# Patient Record
Sex: FEMALE | Race: WHITE | NOT HISPANIC OR LATINO | Employment: OTHER | URBAN - METROPOLITAN AREA
[De-identification: names, ages, dates, MRNs, and addresses within clinical notes are randomized per-mention and may not be internally consistent; named-entity substitution may affect disease eponyms.]

---

## 2017-04-05 ENCOUNTER — HOSPITAL ENCOUNTER (OUTPATIENT)
Facility: CLINIC | Age: 77
Discharge: HOME | End: 2017-04-05
Attending: OPHTHALMOLOGY | Admitting: OPHTHALMOLOGY
Payer: COMMERCIAL

## 2017-04-05 ENCOUNTER — SURGERY (OUTPATIENT)
Age: 77
End: 2017-04-05

## 2017-04-05 VITALS
DIASTOLIC BLOOD PRESSURE: 61 MMHG | TEMPERATURE: 96.8 F | OXYGEN SATURATION: 97 % | SYSTOLIC BLOOD PRESSURE: 131 MMHG | RESPIRATION RATE: 16 BRPM

## 2017-04-05 PROCEDURE — 25000132 ZZH RX MED GY IP 250 OP 250 PS 637: Performed by: OPHTHALMOLOGY

## 2017-04-05 PROCEDURE — 66821 AFTER CATARACT LASER SURGERY: CPT | Performed by: OPHTHALMOLOGY

## 2017-04-05 RX ORDER — TROPICAMIDE 10 MG/ML
1 SOLUTION/ DROPS OPHTHALMIC ONCE
Status: COMPLETED | OUTPATIENT
Start: 2017-04-05 | End: 2017-04-05

## 2017-04-05 RX ORDER — PHENYLEPHRINE HYDROCHLORIDE 25 MG/ML
1 SOLUTION/ DROPS OPHTHALMIC ONCE
Status: COMPLETED | OUTPATIENT
Start: 2017-04-05 | End: 2017-04-05

## 2017-04-05 RX ADMIN — APRACLONIDINE HYDROCHLORIDE 1 DROP: 10 SOLUTION/ DROPS OPHTHALMIC at 10:14

## 2017-04-05 RX ADMIN — TROPICAMIDE 1 DROP: 10 SOLUTION/ DROPS OPHTHALMIC at 10:13

## 2017-04-05 RX ADMIN — PHENYLEPHRINE HYDROCHLORIDE 1 DROP: 2.5 SOLUTION/ DROPS OPHTHALMIC at 10:13

## 2017-04-05 NOTE — OP NOTE
[facility]  Ophthalmology Operative Note    Procedure: Yag laser capsulotomy  Diagnosis: secondary membrane (after cataract)  Eye: OD (right eye)    Surgeon: Gary Blackburn MD  Settings: 2.3 mJ energy/burst                 14  bursts  Total energy: 32.2    1 drop iopidine instilled after procedure.      Comments: Pt. to follow up at office in 48 hours.

## 2017-09-08 ENCOUNTER — HOSPITAL ENCOUNTER (OUTPATIENT)
Dept: MAMMOGRAPHY | Facility: CLINIC | Age: 77
Discharge: HOME | End: 2017-09-08
Attending: INTERNAL MEDICINE | Admitting: INTERNAL MEDICINE
Payer: COMMERCIAL

## 2017-09-08 DIAGNOSIS — Z12.31 VISIT FOR SCREENING MAMMOGRAM: ICD-10-CM

## 2017-09-08 PROCEDURE — G0202 SCR MAMMO BI INCL CAD: HCPCS

## 2017-09-08 PROCEDURE — 77063 BREAST TOMOSYNTHESIS BI: CPT

## 2018-09-14 ENCOUNTER — HOSPITAL ENCOUNTER (OUTPATIENT)
Dept: MAMMOGRAPHY | Facility: CLINIC | Age: 78
Discharge: HOME | End: 2018-09-14
Attending: INTERNAL MEDICINE | Admitting: INTERNAL MEDICINE
Payer: COMMERCIAL

## 2018-09-14 DIAGNOSIS — Z12.31 VISIT FOR SCREENING MAMMOGRAM: ICD-10-CM

## 2018-09-14 PROCEDURE — 77063 BREAST TOMOSYNTHESIS BI: CPT

## 2019-09-16 ENCOUNTER — HOSPITAL ENCOUNTER (OUTPATIENT)
Dept: MAMMOGRAPHY | Facility: CLINIC | Age: 79
Discharge: HOME | End: 2019-09-16
Attending: INTERNAL MEDICINE | Admitting: INTERNAL MEDICINE
Payer: COMMERCIAL

## 2019-09-16 DIAGNOSIS — Z12.31 VISIT FOR SCREENING MAMMOGRAM: ICD-10-CM

## 2019-09-16 PROCEDURE — 77063 BREAST TOMOSYNTHESIS BI: CPT

## 2020-08-28 ENCOUNTER — APPOINTMENT (OUTPATIENT)
Dept: ULTRASOUND IMAGING | Facility: CLINIC | Age: 80
End: 2020-08-28
Attending: EMERGENCY MEDICINE
Payer: COMMERCIAL

## 2020-08-28 ENCOUNTER — HOSPITAL ENCOUNTER (EMERGENCY)
Facility: CLINIC | Age: 80
Discharge: HOME | End: 2020-08-28
Attending: EMERGENCY MEDICINE | Admitting: EMERGENCY MEDICINE
Payer: COMMERCIAL

## 2020-08-28 VITALS
RESPIRATION RATE: 16 BRPM | DIASTOLIC BLOOD PRESSURE: 72 MMHG | HEART RATE: 61 BPM | TEMPERATURE: 98.6 F | OXYGEN SATURATION: 96 % | SYSTOLIC BLOOD PRESSURE: 165 MMHG

## 2020-08-28 DIAGNOSIS — M79.622 PAIN OF LEFT UPPER ARM: ICD-10-CM

## 2020-08-28 PROCEDURE — 99284 EMERGENCY DEPT VISIT MOD MDM: CPT | Mod: 25

## 2020-08-28 PROCEDURE — 93005 ELECTROCARDIOGRAM TRACING: CPT

## 2020-08-28 PROCEDURE — 93971 EXTREMITY STUDY: CPT | Mod: LT

## 2020-08-28 PROCEDURE — 93931 UPPER EXTREMITY STUDY: CPT | Mod: LT

## 2020-08-28 ASSESSMENT — ENCOUNTER SYMPTOMS
NUMBNESS: 0
MYALGIAS: 1

## 2020-08-28 NOTE — ED PROVIDER NOTES
History     Chief Complaint:  Arm Pain     HPI:  Brittani Ty is a 80 year old female on Eliquis who presents from [place] urgent care with left arm pain and swelling. Patient reports aching left arm pain and swelling for the last couple months, worse within the last 2 weeks. She states that she has had vertigo for years and must sleep on her left side. Patient reports that the pain wakes her up at night. She denies any numbness, tingling, or chest pain.    Allergies:  Augmentin  Celecoxib  Percocet   Propranolol  Vioxx     Medications:    Eliquis  Lipitor  Rocaltrol  Diltiazem  Benadryl  Levothyroxine  Prilosec  Miralax  Metamucil  Pravachol  Elavil  Toprol    Past Medical History:    Aortic valve disorder  Arthritis  Hyperlipidemia  Hypertension  Hypoparathyroidism  Mitral valve disorder  Rheumatic heart disease  Thyroid disease  Vitamin D deficiency  A-fib     Past Surgical History:    Cholecystectomy  Hysterectomy  Capsulotomy  Phacoemulsification, clear cornea with standard intraocular lens implant  Sphincterotomy rectum   Parathyroidectomy  Total knee arthroplasty  Thyroidectomy  Oophorectomy     Family History:    Prostate cancer  Heart disease  Colon cancer  Leukemia  MI    Social History:  Smoking status: never  Alcohol use: rare  Drug use: no  Patient presents with son.  PCP: Norah Whittaker    Marital Status:       Review of Systems   Cardiovascular: Negative for chest pain.   Musculoskeletal: Positive for myalgias.        Edema, left arm   Neurological: Negative for numbness.   All other systems reviewed and are negative.    Physical Exam     Vitals:  Patient Vitals for the past 24 hrs:   BP Temp Temp src Pulse Resp SpO2   08/28/20 1500 (!) 165/72 -- -- 61 -- 96 %   08/28/20 1432 (!) 151/79 98.6  F (37  C) Oral 67 16 98 %       Physical Exam:  General: Patient is alert and interactive when I enter the room  Head:  The scalp, face, and head appear normal  Eyes:  Conjunctivae are  normal  ENT:    The nose is normal    Pinnae are normal    External acoustic canals are normal  Neck:  Trachea midline  CV:  Pulses are normal   Resp:  No respiratory distress   Abdomen:      Soft, non-tender, non-distended  Musc:  Normal muscular tone    No major joint effusions    No asymmetric leg swelling    Tenderness to left upper arm, very mild fullness to left upper arm, no erythema or masses palpated, very good ROM of LUE including at shoulder and elbow, 2+ radial pulse, extremity warm and well perfused, able to internal rotation left shoulder with mild pain as well as extend  Skin:  No rash or lesions noted  Neuro:  Speech is normal and fluent. Face is symmetric.     Moving all extremities well.   Psych: Awake. Alert.  Normal affect.  Appropriate interactions.    Emergency Department Course     ECG (14:52:44):  Rate 63 bpm. DC interval 184. QRS duration 88. QT/QTc 454/464. P-R-T axes 35 22 30. Normal sinus rhythm. Interpreted at 1455 by Loretta Gilbert MD.    Imaging:  US Upper Extremity Venous Duplex Left    (Results Pending)   US Upper Extremity Arterial Duplex Left    (Results Pending)       Emergency Department Course:  1438  Past medical records, nursing notes, and vitals reviewed.    1448  I performed an exam of the patient and obtained history, as documented above.    1452 EKG was taken in the ED.     1512 The patient was sent for a left upper extremity ultrasounds while in the emergency department, results above.     The patient was signed out to Dr. Bradley, oncoming ED physician, pending ultrasound results.    I personally reviewed the imaging results with the Patient and spouse who voiced understanding of the findings and answered all related questions prior to sign out.     Impression & Plan     Medical Decision Making:  Brittani yT is a 80 year old female who presents to the emergency department today for evaluation of left arm pain and swelling.  Patient symptoms have been going on for  months.  She is neurovascularly intact.  Her left arm is possibly mildly more full than the right however no obvious edema or masses appreciated on exam.  She does have tenderness to palpation and does admit that she lays on her left side more frequently.  She has significant vertigo that limits her ability to lay flat and has to lay on her left side.  I think this can likely be followed up as an outpatient as I doubt any acute abnormality such as ACS, thoracic outlet syndrome or dissection given timeframe.  We will do ultrasounds of the arterial system and venous system.  Ultrasounds are pending and Dr. Morgan will follow-up on the results.  If negative can discharge with [facility] follow-up given this is likely musculoskeletal.  Patient discharged.    Diagnosis:    ICD-10-CM    1. Pain of left upper arm  M79.622         Disposition:  Signed out to Dr. Bradley, pending ultrasound results.     Discharge Medications:  New Prescriptions    No medications on file       Scribe Disclosure:  I, Radha Johnson, am serving as a scribe at 2:48 PM on 8/28/2020 to document services personally performed by Loretta Gilbert MD based on my observations and the provider's statements to me.       Radha Johnson  8/28/2020     Loretta Gilbert MD  08/28/20 1944

## 2020-08-28 NOTE — ED AVS SNAPSHOT
[facility] Emergency Department  [address]  [facility] [address]  Phone:  [phone]  Fax:  [phone]                                    Brittani Ty   MRN: [id number]    Department:  [facility] Emergency Department   Date of Visit:  8/28/2020           After Visit Summary Signature Page    I have received my discharge instructions, and my questions have been answered. I have discussed any challenges I see with this plan with the nurse or doctor.    ..........................................................................................................................................  Patient/Patient Representative Signature      ..........................................................................................................................................  Patient Representative Print Name and Relationship to Patient    ..................................................               ................................................  Date                                   Time    ..........................................................................................................................................  Reviewed by Signature/Title    ...................................................              ..............................................  Date                                               Time          22EPIC Rev 08/18

## 2020-08-28 NOTE — ED TRIAGE NOTES
Pt c/o left arm pain and swelling for several months.  Worse when she sleeps on it, no numbness/tingling. Pt sent form urgent care for further eval.

## 2020-08-28 NOTE — ED PROVIDER NOTES
Sign-out Note    Received this patient in sign-out from Dr. Gilbert at 4:04 PM.  Please refer to earlier documentation detailing presenting complaints, evaluation, and ED course.  In brief, patient is being seen in the ER for arm pain.    Recommendations from previous provider: F/U on results of LUE US    ED course under my care:    US Upper Extremity Arterial Duplex Left   Final Result   IMPRESSION: No sonographic evidence for significant steno-occlusive   disease in the sampled arteries of the left upper extremity.      KANNAN LLANOS MD      US Upper Extremity Venous Duplex Left   Final Result   IMPRESSION: No evidence of DVT. Normal left upper extremity venous   ultrasound.      RUDOLPH LOPEZ MD        Confirmed with ultrasound that the above read images correspond with the left upper extremity    Disposition: Discharge home with [facility] orthopedics follow-up as recommended by Dr. Gilbert.           Braden Bradley MD  08/28/20 7389

## 2020-08-31 LAB — INTERPRETATION ECG - MUSE: NORMAL

## 2020-10-08 ENCOUNTER — HOSPITAL ENCOUNTER (OUTPATIENT)
Dept: MAMMOGRAPHY | Facility: CLINIC | Age: 80
Discharge: HOME | End: 2020-10-08
Attending: INTERNAL MEDICINE | Admitting: INTERNAL MEDICINE
Payer: COMMERCIAL

## 2020-10-08 DIAGNOSIS — Z12.31 VISIT FOR SCREENING MAMMOGRAM: ICD-10-CM

## 2020-10-08 PROCEDURE — 77063 BREAST TOMOSYNTHESIS BI: CPT

## 2020-10-13 ENCOUNTER — OFFICE VISIT (OUTPATIENT)
Dept: OBGYN | Facility: CLINIC | Age: 80
End: 2020-10-13
Payer: COMMERCIAL

## 2020-10-13 VITALS
SYSTOLIC BLOOD PRESSURE: 162 MMHG | DIASTOLIC BLOOD PRESSURE: 62 MMHG | WEIGHT: 191 LBS | HEIGHT: 62 IN | BODY MASS INDEX: 35.15 KG/M2

## 2020-10-13 DIAGNOSIS — N95.0 POSTMENOPAUSAL BLEEDING: Primary | ICD-10-CM

## 2020-10-13 PROCEDURE — 99202 OFFICE O/P NEW SF 15 MIN: CPT | Performed by: OBSTETRICS & GYNECOLOGY

## 2020-10-13 RX ORDER — METOPROLOL SUCCINATE 50 MG/1
50 TABLET, EXTENDED RELEASE ORAL
COMMUNITY
Start: 2020-03-19 | End: 2024-03-01

## 2020-10-13 ASSESSMENT — ANXIETY QUESTIONNAIRES
1. FEELING NERVOUS, ANXIOUS, OR ON EDGE: NOT AT ALL
GAD7 TOTAL SCORE: 1
6. BECOMING EASILY ANNOYED OR IRRITABLE: NOT AT ALL
2. NOT BEING ABLE TO STOP OR CONTROL WORRYING: NOT AT ALL
IF YOU CHECKED OFF ANY PROBLEMS ON THIS QUESTIONNAIRE, HOW DIFFICULT HAVE THESE PROBLEMS MADE IT FOR YOU TO DO YOUR WORK, TAKE CARE OF THINGS AT HOME, OR GET ALONG WITH OTHER PEOPLE: NOT DIFFICULT AT ALL
5. BEING SO RESTLESS THAT IT IS HARD TO SIT STILL: NOT AT ALL
7. FEELING AFRAID AS IF SOMETHING AWFUL MIGHT HAPPEN: NOT AT ALL
3. WORRYING TOO MUCH ABOUT DIFFERENT THINGS: NOT AT ALL

## 2020-10-13 ASSESSMENT — MIFFLIN-ST. JEOR: SCORE: 1279.62

## 2020-10-13 ASSESSMENT — PATIENT HEALTH QUESTIONNAIRE - PHQ9
5. POOR APPETITE OR OVEREATING: SEVERAL DAYS
SUM OF ALL RESPONSES TO PHQ QUESTIONS 1-9: 3

## 2020-10-13 NOTE — PROGRESS NOTES
SUBJECTIVE:                                                   Brittani Ty is a 80 year old female who presents to clinic today for the following health issue(s):  Patient presents with:  Abnormal Uterine Bleeding: Pt has been experiencing PMB for a few months now. Mainly spotting and happens every few days.      HPI: Patient seen at this time for some vaginal bleeding.  She had a vaginal hysterectomy many years ago.  She then had a second surgery later to have her ovaries removed.  She has noticed some pink and some brown staining on her underwear recently.  She denies any trauma.  She has A. fib and is on a blood thinner.      No LMP recorded. Patient has had a hysterectomy..     Patient is not sexually active, No obstetric history on file..  Using menopause and hysterectomy for contraception.    reports that she has never smoked. She has never used smokeless tobacco.    STD testing offered?  Declined    Health maintenance updated:  yes    Today's PHQ-2 Score: No flowsheet data found.  Today's PHQ-9 Score:   PHQ-9 SCORE 10/13/2020   PHQ-9 Total Score 3     Today's CINDY-7 Score:   CINDY-7 SCORE 10/13/2020   Total Score 1       Problem list and histories reviewed & adjusted, as indicated.  Additional history: as documented.    Patient Active Problem List   Diagnosis     ACP (advance care planning)     A-fib (H)     Past Surgical History:   Procedure Laterality Date     CHOLECYSTECTOMY       EXAM UNDER ANESTHESIA RECTUM  4/26/2012    Procedure:EXAM UNDER ANESTHESIA RECTUM; exam under anesthesia, partial sphincterotomy; Surgeon:AMBROCIO ZUNIGA; Location: [facility]     HYSTERECTOMY       LASER YAG CAPSULOTOMY Left 4/15/2015    Procedure: LASER YAG CAPSULOTOMY;  Surgeon: Gary Blackburn MD;  Location:  [facility]     LASER YAG CAPSULOTOMY Right 4/5/2017    Procedure: LASER YAG CAPSULOTOMY;  Surgeon: Gary Blackburn MD;  [facility]:  [facility]     PHACOEMULSIFICATION CLEAR CORNEA WITH STANDARD INTRAOCULAR LENS IMPLANT Right  9/3/2014    Procedure: PHACOEMULSIFICATION CLEAR CORNEA WITH STANDARD INTRAOCULAR LENS IMPLANT;  Surgeon: Gary Blackburn MD;  Location:  [facility]     PHACOEMULSIFICATION CLEAR CORNEA WITH STANDARD INTRAOCULAR LENS IMPLANT Left 9/17/2014    Procedure: PHACOEMULSIFICATION CLEAR CORNEA WITH STANDARD INTRAOCULAR LENS IMPLANT;  Surgeon: Gary Blackburn MD;  [facility]:  [facility]     SPHINCTEROTOMY RECTUM  4/26/2012    Procedure:SPHINCTEROTOMY RECTUM; Surgeon:AMBROCIO ZUNIGA; Location[facility]      Social History     Tobacco Use     Smoking status: Never Smoker     Smokeless tobacco: Never Used   Substance Use Topics     Alcohol use: Yes     Comment: rare           Current Outpatient Medications   Medication Sig     apixaban ANTICOAGULANT (ELIQUIS) 5 MG tablet Take 1 tablet (5 mg) by mouth 2 times daily     diltiazem 180 MG 24 hr CD capsule Take 1 capsule (180 mg) by mouth daily     metoprolol succinate ER (TOPROL-XL) 50 MG 24 hr tablet Take 50 mg by mouth     Omega-3 Fatty Acids (OMEGA-3 FISH OIL PO) Take 1 capsule by mouth 2 times daily (with meals)      polyethylene glycol (MIRALAX) powder Take 17 g by mouth daily.     psyllium (METAMUCIL) 58.6 % POWD Take 1 teaspoonful by mouth daily     atorvastatin (LIPITOR) 10 MG tablet Take 10 mg by mouth daily     calcitRIOL (ROCALTROL) 0.5 MCG capsule Take 0.5 mcg by mouth daily     diphenhydrAMINE (BENADRYL) 25 MG tablet Take 25 mg by mouth nightly as needed for itching or allergies     levothyroxine (SYNTHROID, LEVOTHROID) 44 MCG tablet Take 44 mcg by mouth every other day     levothyroxine (SYNTHROID, LEVOTHROID) 88 MCG tablet Take 88 mcg by mouth every other day     No current facility-administered medications for this visit.      Allergies   Allergen Reactions     Augmentin [Amoxicillin-Pot Clavulanate]      Celecoxib      Percocet [Oxycodone-Acetaminophen]      Propranolol      Bradycardia into 40's      Vioxx        ROS:  12 point review of systems negative other than  "symptoms noted below or in the HPI.  Genitourinary: Spotting  No urinary frequency or dysuria, bladder or kidney problems      OBJECTIVE:     BP (!) 162/62   Ht 1.575 m (5' 2\")   Wt 86.6 kg (191 lb)   Breastfeeding No   BMI 34.93 kg/m    Body mass index is 34.93 kg/m .    Exam:  Constitutional:  Appearance: Well nourished, well developed alert, in no acute distress  Lymphatic: Lymph Nodes:  No other lymphadenopathy present  Skin: General Inspection:  No rashes present, no lesions present, no areas of discoloration.  Neurologic:  Mental Status:  Oriented X3.  Normal strength and tone, sensory exam grossly normal, mentation intact and speech normal.    Psychiatric:  Mentation appears normal and affect normal/bright.  Pelvic Exam:  External Genitalia:     Normal appearance for age, no discharge present, no tenderness present, no inflammatory lesions present, color normal  Vagina:     Normal vaginal vault without central or paravaginal defects, no discharge present, no inflammatory lesions present, no masses present  Bladder:     Nontender to palpation  Urethra:   Urethral Body:  Urethra palpation normal, urethra structural support normal   Urethral Meatus:  No erythema or lesions present  Cervix:     Surgically absent  Uterus:     Surgically absent  Adnexa:     Surgically absent  Perineum:     Perineum within normal limits, no evidence of trauma, no rashes or skin lesions present  Anus:     Anus within normal limits, no hemorrhoids present  Inguinal Lymph Nodes:     No lymphadenopathy present     In-Clinic Test Results:      ASSESSMENT/PLAN:                                                      80-year-old with some spotting.  Her only finding is a small area of cracking of the posterior fourchette.  Being on a blood thinner she could have just some atrophy that is leading to occasional spotting.  If this persist she will return.  There was nothing on her speculum and bimanual examination is she has had a " hysterectomy and removal of ovaries.  On her        Edyue Lucas MD  [facility]

## 2020-10-14 ASSESSMENT — ANXIETY QUESTIONNAIRES: GAD7 TOTAL SCORE: 1

## 2021-02-18 DIAGNOSIS — Z11.59 ENCOUNTER FOR SCREENING FOR OTHER VIRAL DISEASES: Primary | ICD-10-CM

## 2021-03-06 ENCOUNTER — HOSPITAL ENCOUNTER (OUTPATIENT)
Dept: LAB | Facility: CLINIC | Age: 81
Discharge: HOME | End: 2021-03-06
Attending: INTERNAL MEDICINE | Admitting: INTERNAL MEDICINE
Payer: COMMERCIAL

## 2021-03-06 DIAGNOSIS — Z11.59 ENCOUNTER FOR SCREENING FOR OTHER VIRAL DISEASES: ICD-10-CM

## 2021-03-06 LAB
SARS-COV-2 RNA RESP QL NAA+PROBE: NORMAL
SPECIMEN SOURCE: NORMAL

## 2021-03-06 PROCEDURE — U0005 INFEC AGEN DETEC AMPLI PROBE: HCPCS | Performed by: INTERNAL MEDICINE

## 2021-03-06 PROCEDURE — U0003 INFECTIOUS AGENT DETECTION BY NUCLEIC ACID (DNA OR RNA); SEVERE ACUTE RESPIRATORY SYNDROME CORONAVIRUS 2 (SARS-COV-2) (CORONAVIRUS DISEASE [COVID-19]), AMPLIFIED PROBE TECHNIQUE, MAKING USE OF HIGH THROUGHPUT TECHNOLOGIES AS DESCRIBED BY CMS-2020-01-R: HCPCS | Performed by: INTERNAL MEDICINE

## 2021-03-07 LAB
LABORATORY COMMENT REPORT: NORMAL
SARS-COV-2 RNA RESP QL NAA+PROBE: NEGATIVE
SPECIMEN SOURCE: NORMAL

## 2021-03-10 ENCOUNTER — HOSPITAL ENCOUNTER (OUTPATIENT)
Facility: CLINIC | Age: 81
Discharge: HOME | End: 2021-03-10
Attending: INTERNAL MEDICINE | Admitting: INTERNAL MEDICINE
Payer: COMMERCIAL

## 2021-03-10 VITALS
OXYGEN SATURATION: 95 % | WEIGHT: 180 LBS | HEART RATE: 60 BPM | BODY MASS INDEX: 33.13 KG/M2 | RESPIRATION RATE: 16 BRPM | SYSTOLIC BLOOD PRESSURE: 125 MMHG | HEIGHT: 62 IN | DIASTOLIC BLOOD PRESSURE: 76 MMHG | TEMPERATURE: 99.5 F

## 2021-03-10 LAB — COLONOSCOPY: NORMAL

## 2021-03-10 PROCEDURE — 45378 DIAGNOSTIC COLONOSCOPY: CPT | Performed by: INTERNAL MEDICINE

## 2021-03-10 PROCEDURE — G0500 MOD SEDAT ENDO SERVICE >5YRS: HCPCS | Performed by: INTERNAL MEDICINE

## 2021-03-10 PROCEDURE — 250N000011 HC RX IP 250 OP 636: Performed by: INTERNAL MEDICINE

## 2021-03-10 RX ORDER — LIDOCAINE 40 MG/G
CREAM TOPICAL
Status: DISCONTINUED | OUTPATIENT
Start: 2021-03-10 | End: 2021-03-10 | Stop reason: HOSPADM

## 2021-03-10 RX ORDER — FENTANYL CITRATE 50 UG/ML
INJECTION, SOLUTION INTRAMUSCULAR; INTRAVENOUS PRN
Status: DISCONTINUED | OUTPATIENT
Start: 2021-03-10 | End: 2021-03-10 | Stop reason: HOSPADM

## 2021-03-10 RX ORDER — ONDANSETRON 2 MG/ML
4 INJECTION INTRAMUSCULAR; INTRAVENOUS
Status: DISCONTINUED | OUTPATIENT
Start: 2021-03-10 | End: 2021-03-10 | Stop reason: HOSPADM

## 2021-03-10 ASSESSMENT — MIFFLIN-ST. JEOR: SCORE: 1221.78

## 2021-03-10 NOTE — PROGRESS NOTES
[facility]  Pre-Endoscopy History and Physical     Brittani Ty MRN# [id number]   YOB: 1940 Age: 80 year old   Today's Date: 03/10/2021    Date of Procedure: 3/10/2021  Primary care provider: Norah Whittaker  Type of Endoscopy: colonoscopy  Reason for Procedure: (+) Cologuard  Type of Anesthesia Anticipated: Moderate (conscious) sedation    HPI:    Brittani is a 80 year old female who will be undergoing the above procedure.      A history and physical has been performed. The patient's medications and allergies have been reviewed. The risks and benefits of the procedure and the sedation options and risks were discussed with the patient.  All questions were answered and informed consent was obtained.      Allergies   Allergen Reactions     Augmentin [Amoxicillin-Pot Clavulanate]      Celecoxib      Percocet [Oxycodone-Acetaminophen]      Propranolol      Bradycardia into 40's      Vioxx         Current Facility-Administered Medications   Medication     0.9% sodium chloride BOLUS     lidocaine (LMX4) kit     lidocaine 1 % 0.1-1 mL     ondansetron (ZOFRAN) injection 4 mg     sodium chloride (PF) 0.9% PF flush 3 mL     sodium chloride (PF) 0.9% PF flush 3 mL     sodium chloride (PF) 0.9% PF flush 3 mL       Patient Active Problem List   Diagnosis     ACP (advance care planning)     A-fib (H)        Past Medical History:   Diagnosis Date     Aortic valve disorder      Arthritis      Chest pain, unspecified     ECHO 12/2011-NO ISCHEMIA     Dyspepsia      Hyperlipidemia      Hypertension      Hypoparathyroidism (H)      Mitral valve disorder      Overweight(278.02)      PONV (postoperative nausea and vomiting)      Rheumatic heart disease      Thyroid disease     HYPOTHYROID     Vitamin D deficiency         Past Surgical History:   Procedure Laterality Date     CHOLECYSTECTOMY       COLONOSCOPY       EXAM UNDER ANESTHESIA RECTUM  4/26/2012    Procedure:EXAM UNDER ANESTHESIA RECTUM; exam under  "anesthesia, partial sphincterotomy; Surgeon:AMBROCIO ZUNIGA; Location:[facility]     HEAD & NECK SURGERY      Parathyroid tumor     HYSTERECTOMY       LASER YAG CAPSULOTOMY Left 4/15/2015    Procedure: LASER YAG CAPSULOTOMY;  Surgeon: Gary Blackburn MD;  [facility]     LASER YAG CAPSULOTOMY Right 4/5/2017    Procedure: LASER YAG CAPSULOTOMY;  Surgeon: Gary Blackburn MD;  [facility]     PHACOEMULSIFICATION CLEAR CORNEA WITH STANDARD INTRAOCULAR LENS IMPLANT Right 9/3/2014    Procedure: PHACOEMULSIFICATION CLEAR CORNEA WITH STANDARD INTRAOCULAR LENS IMPLANT;  Surgeon: Gary Blackburn MD;  Location:  [facility]     PHACOEMULSIFICATION CLEAR CORNEA WITH STANDARD INTRAOCULAR LENS IMPLANT Left 9/17/2014    Procedure: PHACOEMULSIFICATION CLEAR CORNEA WITH STANDARD INTRAOCULAR LENS IMPLANT;  Surgeon: Gary Blackburn MD;  [facility]     SPHINCTEROTOMY RECTUM  4/26/2012    Procedure:SPHINCTEROTOMY RECTUM; Surgeon:AMBROCIO ZUNIGA; Location:[facility]       Social History     Tobacco Use     Smoking status: Never Smoker     Smokeless tobacco: Never Used   Substance Use Topics     Alcohol use: Yes     Comment: rare       Family History   Problem Relation Age of Onset     Colon Cancer Mother      Colon Cancer Paternal Half-Sister      Colon Cancer Cousin          PHYSICAL EXAM:   BP (!) 148/80   Pulse 71   Temp 99.5  F (37.5  C) (Temporal)   Resp 20   Ht 1.562 m (5' 1.5\")   Wt 81.6 kg (180 lb)   SpO2 96%   BMI 33.46 kg/m   Estimated body mass index is 33.46 kg/m  as calculated from the following:    Height as of this encounter: 1.562 m (5' 1.5\").    Weight as of this encounter: 81.6 kg (180 lb).   RESP: lungs clear to auscultation - no rales, rhonchi or wheezes  CV: regular rates and rhythm    IMPRESSION   ASA Class 3 - Severe systemic disease, but not incapacitating  Mallampati Score: 2      Reginaldo Bear MD                "

## 2021-03-10 NOTE — DISCHARGE INSTRUCTIONS
Understanding Diverticulosis and Diverticulitis     Pouches or diverticula usually occur in the lower part of the colon called the sigmoid.      Diverticulitis occurs when the pouches become inflamed.     The colon (large intestine) is the last part of the digestive tract. It absorbs water from stool and changes it from a liquid to a solid. In certain cases, small pouches called diverticula can form in the colon wall. This condition is called diverticulosis. The pouches can become infected. If this happens, it becomes a more serious problem called diverticulitis. These problems can be painful. But they can be managed.   Managing Your Condition  Diet changes or taking medications are often tried first. These may be enough to bring relief. If the case is bad, surgery may be done. You and your doctor can discuss the plan that is best for you.  If You Have Diverticulosis  Diet changes are often enough to control symptoms. The main changes are adding fiber (roughage) and drinking more water. Fiber absorbs water as it travels through your colon. This helps your stool stay soft and move smoothly. Water helps this process. If needed, you may be told to take over-the-counter stool softeners. To help relieve pain, antispasmodic medications may be prescribed.  If You Have Diverticulitis  Treatment depends on how bad your symptoms are.  For mild symptoms: You may be put on a liquid diet for a short time. You may also be prescribed antibiotics. If these two steps relieve your symptoms, you may then be prescribed a high-fiber diet. If you still have symptoms, your doctor will discuss further treatment options with you.  For severe symptoms: You may need to be admitted to the hospital. There, you can be given IV antibiotics and fluids. Once symptoms are under control, the above treatments may be tried. If these don t control your condition, your doctor may discuss the option of having surgery with you.  [place] to Colon  Health  Help keep your colon healthy with a diet that includes plenty of high-fiber fruits, vegetables, and whole grains. Drink plenty of liquids like water and juice. Your doctor may also recommend avoiding seeds and nuts.          [id number] [employer], [address] [place] [address], [place] [address]. All rights reserved. This information is not intended as a substitute for professional medical care. Always follow your healthcare professional's instructions.    HIGH FIBER DIET  Fiber is present in all fruits, vegetables, cereals and grains. Fiber passes through the body undigested. A high fiber diet helps food move through the intestinal tract. The added bulk is helpful in preventing constipation. In people with diverticulosis it serves to clean out the pouches along the colon wall while preventing new ones from forming. A high fiber diet also reduces the risk of colon cancer, decreases blood cholesterol and prevents high blood sugar in people with diabetes.    The foods listed below are high in fiber and should be included in your diet. If you are not used to high fiber foods, start with 1 or 2 foods from this list. Every 3-4 days add a new one to your diet until you are eating 4 high fiber foods per day. This should give you 20-35 Gm of fiber/day. It is also important to drink a lot of water when you are on this diet (6-8 glasses a day). Water causes the fiber to swell and increases the benefit.    FOODS HIGH IN DIETARY FIBER:  BREADS: Made with 100% whole wheat flour; jorge, wheat or rye crackers; tortillas, bran muffins  CEREALS: Whole grain cereal with bran (Chex, Raisin Bran, Corn Bran), oatmeal, rolled oats, granola, wheat flakes, brown rice  NUTS: Any nuts  FRUITS: All fresh fruits along with edible skins, (bananas, citrus fruit, mangoes, pears, prunes, raisins, apples, pineapple, apricot, melon, jams and marmalades), fruit juices (especially prune juice)  VEGETABLES: All types, preferably raw or lightly  cooked: especially, celery, eggplant, potatoes, spinach, broccoli, brussel sprouts, winter squash, carrots, cauliflower, soybeans, lentils, fresh and dried beans of all kinds  OTHER: Popcorn, any spices      [id number] [employer], [address] [place] [address], [place] [address]. All rights reserved. This information is not intended as a substitute for professional medical care. Always follow your healthcare professional's instructions.

## 2021-05-31 ENCOUNTER — HOSPITAL ENCOUNTER (EMERGENCY)
Facility: CLINIC | Age: 81
Discharge: HOME | End: 2021-05-31
Attending: EMERGENCY MEDICINE | Admitting: EMERGENCY MEDICINE
Payer: COMMERCIAL

## 2021-05-31 VITALS
SYSTOLIC BLOOD PRESSURE: 149 MMHG | RESPIRATION RATE: 16 BRPM | OXYGEN SATURATION: 96 % | TEMPERATURE: 97.9 F | HEART RATE: 58 BPM | DIASTOLIC BLOOD PRESSURE: 69 MMHG

## 2021-05-31 DIAGNOSIS — I48.0 PAROXYSMAL ATRIAL FIBRILLATION (H): ICD-10-CM

## 2021-05-31 LAB
ANION GAP SERPL CALCULATED.3IONS-SCNC: 9 MMOL/L (ref 3–14)
BASOPHILS # BLD AUTO: 0.1 10E9/L (ref 0–0.2)
BASOPHILS NFR BLD AUTO: 0.5 %
BUN SERPL-MCNC: 9 MG/DL (ref 7–30)
CALCIUM SERPL-MCNC: 8.5 MG/DL (ref 8.5–10.1)
CHLORIDE SERPL-SCNC: 104 MMOL/L (ref 94–109)
CO2 SERPL-SCNC: 25 MMOL/L (ref 20–32)
CREAT SERPL-MCNC: 0.98 MG/DL (ref 0.52–1.04)
DIFFERENTIAL METHOD BLD: ABNORMAL
EOSINOPHIL # BLD AUTO: 0.2 10E9/L (ref 0–0.7)
EOSINOPHIL NFR BLD AUTO: 2.2 %
ERYTHROCYTE [DISTWIDTH] IN BLOOD BY AUTOMATED COUNT: 12.4 % (ref 10–15)
GFR SERPL CREATININE-BSD FRML MDRD: 53 ML/MIN/{1.73_M2}
GLUCOSE SERPL-MCNC: 156 MG/DL (ref 70–99)
HCT VFR BLD AUTO: 49.1 % (ref 35–47)
HGB BLD-MCNC: 16.8 G/DL (ref 11.7–15.7)
IMM GRANULOCYTES # BLD: 0 10E9/L (ref 0–0.4)
IMM GRANULOCYTES NFR BLD: 0.3 %
LYMPHOCYTES # BLD AUTO: 3.2 10E9/L (ref 0.8–5.3)
LYMPHOCYTES NFR BLD AUTO: 29.9 %
MCH RBC QN AUTO: 32.6 PG (ref 26.5–33)
MCHC RBC AUTO-ENTMCNC: 34.2 G/DL (ref 31.5–36.5)
MCV RBC AUTO: 95 FL (ref 78–100)
MONOCYTES # BLD AUTO: 0.6 10E9/L (ref 0–1.3)
MONOCYTES NFR BLD AUTO: 6.1 %
NEUTROPHILS # BLD AUTO: 6.4 10E9/L (ref 1.6–8.3)
NEUTROPHILS NFR BLD AUTO: 61 %
NRBC # BLD AUTO: 0 10*3/UL
NRBC BLD AUTO-RTO: 0 /100
PLATELET # BLD AUTO: 286 10E9/L (ref 150–450)
POTASSIUM SERPL-SCNC: 3.7 MMOL/L (ref 3.4–5.3)
RBC # BLD AUTO: 5.16 10E12/L (ref 3.8–5.2)
SODIUM SERPL-SCNC: 138 MMOL/L (ref 133–144)
TROPONIN I SERPL-MCNC: <0.015 UG/L (ref 0–0.04)
WBC # BLD AUTO: 10.5 10E9/L (ref 4–11)

## 2021-05-31 PROCEDURE — 258N000003 HC RX IP 258 OP 636: Performed by: EMERGENCY MEDICINE

## 2021-05-31 PROCEDURE — 85025 COMPLETE CBC W/AUTO DIFF WBC: CPT | Performed by: EMERGENCY MEDICINE

## 2021-05-31 PROCEDURE — 999N000055 HC STATISTIC END TITIAL CO2 MONITORING

## 2021-05-31 PROCEDURE — 96375 TX/PRO/DX INJ NEW DRUG ADDON: CPT | Mod: 59

## 2021-05-31 PROCEDURE — 96361 HYDRATE IV INFUSION ADD-ON: CPT | Mod: 59

## 2021-05-31 PROCEDURE — 92960 CARDIOVERSION ELECTRIC EXT: CPT

## 2021-05-31 PROCEDURE — 84484 ASSAY OF TROPONIN QUANT: CPT | Performed by: EMERGENCY MEDICINE

## 2021-05-31 PROCEDURE — 250N000009 HC RX 250: Performed by: EMERGENCY MEDICINE

## 2021-05-31 PROCEDURE — 80048 BASIC METABOLIC PNL TOTAL CA: CPT | Performed by: EMERGENCY MEDICINE

## 2021-05-31 PROCEDURE — 96374 THER/PROPH/DIAG INJ IV PUSH: CPT | Mod: 59

## 2021-05-31 PROCEDURE — 250N000011 HC RX IP 250 OP 636: Performed by: EMERGENCY MEDICINE

## 2021-05-31 PROCEDURE — 999N000157 HC STATISTIC RCP TIME EA 10 MIN

## 2021-05-31 PROCEDURE — 99285 EMERGENCY DEPT VISIT HI MDM: CPT | Mod: 25

## 2021-05-31 RX ORDER — DILTIAZEM HYDROCHLORIDE 5 MG/ML
20 INJECTION INTRAVENOUS ONCE
Status: COMPLETED | OUTPATIENT
Start: 2021-05-31 | End: 2021-05-31

## 2021-05-31 RX ORDER — ONDANSETRON 2 MG/ML
4 INJECTION INTRAMUSCULAR; INTRAVENOUS EVERY 30 MIN PRN
Status: DISCONTINUED | OUTPATIENT
Start: 2021-05-31 | End: 2021-05-31 | Stop reason: HOSPADM

## 2021-05-31 RX ORDER — PROPOFOL 10 MG/ML
50 INJECTION, EMULSION INTRAVENOUS ONCE
Status: COMPLETED | OUTPATIENT
Start: 2021-05-31 | End: 2021-05-31

## 2021-05-31 RX ADMIN — DILTIAZEM HYDROCHLORIDE 20 MG: 5 INJECTION, SOLUTION INTRAVENOUS at 08:27

## 2021-05-31 RX ADMIN — SODIUM CHLORIDE 500 ML: 9 INJECTION, SOLUTION INTRAVENOUS at 08:27

## 2021-05-31 RX ADMIN — PROPOFOL 70 MG: 10 INJECTION, EMULSION INTRAVENOUS at 09:55

## 2021-05-31 ASSESSMENT — ENCOUNTER SYMPTOMS
DYSURIA: 0
ABDOMINAL PAIN: 0
DIARRHEA: 0
CONSTIPATION: 0
NAUSEA: 1
LIGHT-HEADEDNESS: 0
FREQUENCY: 0
PALPITATIONS: 1
DIFFICULTY URINATING: 0
HEMATURIA: 0
FEVER: 0
BLOOD IN STOOL: 0

## 2021-05-31 NOTE — ED PROVIDER NOTES
History   Chief Complaint:  Jaw Pain       The history is provided by the patient.      Brittani Ty is a 80 year old female on Eliquis with history of atrial fibrillation, heart disease, and hypertension who presents with jaw pain. Patient states jaw pain started last night. This morning, she reports her watch was showing a heart rate consistently above 120. She states the watch shows atrial fibrillation and informs her to have a check up. Here in the ED, patient states palpitations have since resolved. She notes nausea but states this is not different from her baseline. Patient has had two cardioversions in the ED. Patient denies abdominal pain, fever, lightheadedness, leg swelling, or changes in urination or bowel movement. She further denies history of heart failure. Of note, patient is on Eliquis and states she has not missed a dose of it. No tobacco, alcohol, or drug use.     Review of Systems   Constitutional: Negative for fever.   HENT:        Jaw pain    Cardiovascular: Positive for palpitations (since resolved). Negative for leg swelling.   Gastrointestinal: Positive for nausea. Negative for abdominal pain, blood in stool, constipation and diarrhea.   Genitourinary: Negative for difficulty urinating, dysuria, frequency and hematuria.   Neurological: Negative for light-headedness.   All other systems reviewed and are negative.        Allergies:  Augmentin   Celecoxib  Percocet [Oxycodone-Acetaminophen]  Propranolol  Vioxx  Rofecoxib  Lisinopril   Sulindac    Medications:  Eliquis  Diltiazem   Elavil  Lipitor  Calcitriol  Benadryl  Levothyroxine  Metoprolol succinate  Miralax    Past Medical History:    Spondylosis  Syncope   Tremor  Atrial fibrillation   Mitral valve disorders  Rheumatic heart disease  Hypothyroidism   Hypertension   Hypercalcemia  Diverticulosis  Osteoarthritis  Hyperlipidemia   DJD  Aortic valve disorder    Past Surgical History:    Hysterectomy  Oophorectomy  Thyroidectomy    Cholecystectomy  Total knee arthroplasty  Sphincterotomy     Family History:    Father: prostate cancer, heart attack   Mother: leukemia, colon cancer    Social History:  Patient presents to the ED with son.  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      Physical Exam     Patient Vitals for the past 24 hrs:   BP Temp Temp src Pulse Resp SpO2   05/31/21 1115 (!) 149/69 -- -- 58 -- 96 %   05/31/21 1100 (!) 142/88 -- -- 57 -- 97 %   05/31/21 1045 (!) 142/66 -- -- 56 -- 97 %   05/31/21 1011 132/55 -- -- 59 -- 99 %   05/31/21 1008 132/55 -- -- 59 -- 97 %   05/31/21 1000 130/61 -- -- 60 -- 96 %   05/31/21 0955 139/74 -- -- 79 -- 98 %   05/31/21 0950 -- -- -- 91 -- 99 %   05/31/21 0945 (!) 164/76 -- -- 76 -- 99 %   05/31/21 0938 135/85 97.9  F (36.6  C) Oral 80 16 99 %   05/31/21 0915 (!) 143/123 -- -- 69 -- 95 %   05/31/21 0900 131/65 -- -- 67 -- 95 %   05/31/21 0845 129/68 -- -- 64 -- 96 %   05/31/21 0830 113/65 -- -- 105 -- 99 %   05/31/21 0815 116/88 -- -- 101 -- 99 %   05/31/21 0753 (!) 130/94 97.8  F (36.6  C) -- 104 18 96 %       Physical Exam  Constitutional: Well developed, nontox appearance  Head: Atraumatic.   Mouth/Throat: Oropharynx is clear and moist.   Neck:  no stridor  Eyes: no scleral icterus  Cardiovascular: Irregularly irregular rate and rhythm, 2+ bilat radial pulses  Pulmonary/Chest: nml resp effort, Clear BS bilat  Abdominal: ND, soft, NT, no rebound or guarding   Ext: Warm, well perfused, no edema  Neurological: A&O, symmetric facies, moves ext x4  Skin: Skin is warm and dry.   Psychiatric: Behavior is normal. Thought content normal.   Nursing note and vitals reviewed.    Emergency Department Course     ECG:  ECG taken at 0803, ECG read at 0805  Atrial fibrillation with rapid ventricular response. Nonspecific ST abnormality. Abnormal QRS-T angle, consider primary T wave abnormality. Abnormal ECG.    No significant change as compared to prior, dated 8/28/2020.  Rate 109 bpm. NC interval * ms. QRS  duration 86 ms. QT/QTc 308/414 ms. P-R-T axes * 28 186.     ECG  ECG taken at 0957, ECG read at 0957  Sinus rhythm with premature supraventricular complexes. Otherwise normal ECG.    No significant change as compared to prior, dated 5/31/2021.  Rate 67 bpm. IA interval 192 ms. QRS duration 86 ms. QT/QTc 442/467 ms. P-R-T axes 70 18 9.     Laboratory:   CBC: WBC: 10.5, HGB: 16.8 (H) , PLT: 286    BMP: Glucose 156 (H), GFR: 53 (L) , o/w WNL (Creatinine: 0.98)    Troponin (Collected 0807): <0.015      [facility]    -Cardioversion External    Date/Time: 5/31/2021 9:20 AM  Performed by: Lee De Dios MD  Authorized by: Lee De Dios MD     ED EVALUATION:      Assessment Time: 5/31/2021 9:20 AM      I have performed an Emergency Department Evaluation including taking a history and physical examination, this evaluation will be documented in the electronic medical record for this ED encounter.     Indication: atrial fibrillation    ASA Class: Class 2- mild systemic disease, no acute problems, no functional limitations    Mallampati: Grade 2- soft palate, base of uvula, tonsillar pillars, and portion of posterior pharyngeal wall visible    NPO Status: appropriately NPO for procedure  UNIVERSAL PROTOCOL   Site Marked: NA  Prior Images Obtained and Reviewed:  NA  Required items: Required blood products, implants, devices and special equipment available    Patient identity confirmed:  Verbally with patient and arm band  Patient was reevaluated immediately before administering moderate or deep sedation or anesthesia  Confirmation Checklist:  Patient's identity using two indicators, relevant allergies, procedure was appropriate and matched the consent or emergent situation and correct equipment/implants were available  Time out: Immediately prior to the procedure a time out was called    Universal Protocol: the Joint Commission Universal Protocol was followed    Preparation: Patient was  prepped and draped in usual sterile fashion          SEDATION    Patient Sedated: Yes    Sedation Type:  Moderate (conscious) sedation  Sedation:  Propofol  Vital signs: Vital signs monitored during sedation      PRE-PROCEDURE DETAILS:     Cardioversion basis:  Elective    Rhythm:  Atrial fibrillation    Electrode placement:  Anterior-posterior  Attempt one:     Cardioversion mode:  Synchronous    Waveform:  Biphasic    Shock (Joules):  100    Shock outcome:  Conversion to normal sinus rhythm  Post-procedure details:     Patient status:  Awake    PROCEDURE   Patient Tolerance:  Patient tolerated the procedure well with no immediate complications    Length of time physician/provider present for 1:1 monitoring during sedation: 5      Emergency Department Course:    Reviewed:  I reviewed nursing notes, vitals, past medical history and care everywhere    Assessments:  08 I obtained history and examined the patient as noted above.   09 I rechecked the patient and explained to her procedures regarding cardioversion.  955 I performed cardioversion, see above.  101 I reassessed the patient and discussed the results of the evaluation thus far.     Interventions:  827  mL IV  827 Cardizem 20 mg IV  955 Propofol 70 mg IV    Disposition:  The patient was discharged to home.       Impression & Plan     CMS Diagnoses: None    Medical Decision Makin year old female presenting w/ transient jaw discomfort now resolved, palpitations     EKG upon arrival demonstrates atrial fibrillation with RVR.  The patient is anticoagulated and reports that she has been cardioverted in the emergency department before.  Less likely acute ACS, PE given previous anticoagulation status.  DDx includes volume depletion, electrolyte abnormality, A. fib with RVR NOS.  Labs significant for neg trop.  Given no shortness of breath or chest discomfort, thoracic imaging deferred.  Attempted conservative treatment emergency department with  diltiazem and small fluid bolus resulting in rate control but not conversion to sinus rhythm.  Risks and benefits of cardioversion discussed and patient elected to proceed in ED.  Cardioversion performed as noted above w/ conversion NSR.  No recurrence of A fib in ED.  At this time I feel the pt is safe for discharge.  Recommendations given regarding follow up with PCP and return to the emergency department as needed for new or worsening symptoms.  Pt and son counseled on all results, disposition and diagnosis.  They are understanding and agreeable to plan. Patient discharged in stable condition.            Diagnosis:    ICD-10-CM    1. Paroxysmal atrial fibrillation (H)  I48.0          Scribe Disclosure:  Sherita LOMBARDI, am serving as a scribe at 8:06 AM on 5/31/2021 to document services personally performed by Lee De Dios MD based on my observations and the provider's statements to me.            Lee De Dios MD  06/01/21 0156

## 2021-05-31 NOTE — ED TRIAGE NOTES
Patient presents to the ED reporting awoke with jaw pain, which she often has when she is in a fib. Reports watch was showing a heart rate consistently above 120.

## 2021-05-31 NOTE — DISCHARGE INSTRUCTIONS
Continue medications as previously prescribed.    Please follow-up with your cardiologist as needed.    Please return to the emergency department as needed for new or worsening symptoms including fainting, chest pain, severe shortness of breath, recurrence of your atrial fibrillation, any other concerning symptoms.

## 2021-05-31 NOTE — PROGRESS NOTES
"An ETCO2 monitor was placed on the pt with 3 LPM bled in. The Ambu bag, suction, and airways were setup and present in the room,but not needed, she did need a jaw thrust for a brief period due to her low RR and somnolent. ETCO2 levels were maintained between, 34/23, RR 17/18, HR 91/60, SPO2 99/100%. Will continue to monitor and assess the pt's current respiratory status and needs.     Vital signs:  Temp: 97.9  F (36.6  C) Temp src: Oral BP: 132/55 Pulse: 59   Resp: 16 SpO2: 99 % O2 Device: Nasal cannula(MD ordered pre-oxygenation) Oxygen Delivery: 1 LPM      Estimated body mass index is 33.46 kg/m  as calculated from the following:    Height as of 3/10/21: 1.562 m (5' 1.5\").    Weight as of 3/10/21: 81.6 kg (180 lb).    Past Medical History:   Diagnosis Date     Aortic valve disorder      Arthritis      Chest pain, unspecified     ECHO 12/2011-NO ISCHEMIA     Dyspepsia      Hyperlipidemia      Hypertension      Hypoparathyroidism (H)      Mitral valve disorder      Overweight(278.02)      PONV (postoperative nausea and vomiting)      Rheumatic heart disease      Thyroid disease     HYPOTHYROID     Vitamin D deficiency        Past Surgical History:   Procedure Laterality Date     CHOLECYSTECTOMY       COLONOSCOPY       COLONOSCOPY N/A 3/10/2021    Procedure: COLONOSCOPY;  Surgeon: Reginaldo Bear MD;  Location:  GI     EXAM UNDER ANESTHESIA RECTUM  4/26/2012    Procedure:EXAM UNDER ANESTHESIA RECTUM; exam under anesthesia, partial sphincterotomy; Surgeon:AMBROCIO ZUNIGA; Location:[facility]     HEAD & NECK SURGERY      Parathyroid tumor     HYSTERECTOMY       LASER YAG CAPSULOTOMY Left 4/15/2015    Procedure: LASER YAG CAPSULOTOMY;  Surgeon: Gary Blackburn MD;  Location: [facility]     LASER YAG CAPSULOTOMY Right 4/5/2017    Procedure: LASER YAG CAPSULOTOMY;  Surgeon: Gary Blackburn MD;  Location: [facility]     PHACOEMULSIFICATION CLEAR CORNEA WITH STANDARD INTRAOCULAR LENS IMPLANT Right 9/3/2014    " Procedure: PHACOEMULSIFICATION CLEAR CORNEA WITH STANDARD INTRAOCULAR LENS IMPLANT;  Surgeon: Gary Blackburn MD;  Location:  [facility]     PHACOEMULSIFICATION CLEAR CORNEA WITH STANDARD INTRAOCULAR LENS IMPLANT Left 9/17/2014    Procedure: PHACOEMULSIFICATION CLEAR CORNEA WITH STANDARD INTRAOCULAR LENS IMPLANT;  Surgeon: Gary Blackburn MD;  Location:  [facility]     SPHINCTEROTOMY RECTUM  4/26/2012    Procedure:SPHINCTEROTOMY RECTUM; Surgeon:AMBROCIO ZUNIGA; Location:[facility]       Family History   Problem Relation Age of Onset     Colon Cancer Mother      Colon Cancer Paternal Half-Sister      Colon Cancer Cousin        Social History     Tobacco Use     Smoking status: Never Smoker     Smokeless tobacco: Never Used   Substance Use Topics     Alcohol use: Yes     Comment: selwyn BELTRAN  [facility]  5/31/2021

## 2021-06-01 LAB
INTERPRETATION ECG - MUSE: NORMAL
INTERPRETATION ECG - MUSE: NORMAL

## 2021-06-30 ENCOUNTER — APPOINTMENT (OUTPATIENT)
Dept: GENERAL RADIOLOGY | Facility: CLINIC | Age: 81
End: 2021-06-30
Attending: EMERGENCY MEDICINE
Payer: COMMERCIAL

## 2021-06-30 ENCOUNTER — HOSPITAL ENCOUNTER (EMERGENCY)
Facility: CLINIC | Age: 81
Discharge: HOME | End: 2021-06-30
Attending: EMERGENCY MEDICINE | Admitting: EMERGENCY MEDICINE
Payer: COMMERCIAL

## 2021-06-30 VITALS
OXYGEN SATURATION: 97 % | TEMPERATURE: 97 F | SYSTOLIC BLOOD PRESSURE: 139 MMHG | WEIGHT: 172 LBS | RESPIRATION RATE: 16 BRPM | BODY MASS INDEX: 31.97 KG/M2 | DIASTOLIC BLOOD PRESSURE: 70 MMHG | HEART RATE: 66 BPM

## 2021-06-30 DIAGNOSIS — I48.91 ATRIAL FIBRILLATION WITH RVR (H): ICD-10-CM

## 2021-06-30 LAB
ANION GAP SERPL CALCULATED.3IONS-SCNC: 5 MMOL/L (ref 3–14)
BASOPHILS # BLD AUTO: 0 10E9/L (ref 0–0.2)
BASOPHILS NFR BLD AUTO: 0.4 %
BUN SERPL-MCNC: 10 MG/DL (ref 7–30)
CALCIUM SERPL-MCNC: 8.7 MG/DL (ref 8.5–10.1)
CHLORIDE SERPL-SCNC: 106 MMOL/L (ref 94–109)
CO2 SERPL-SCNC: 27 MMOL/L (ref 20–32)
CREAT SERPL-MCNC: 0.88 MG/DL (ref 0.52–1.04)
DIFFERENTIAL METHOD BLD: NORMAL
EOSINOPHIL # BLD AUTO: 0.2 10E9/L (ref 0–0.7)
EOSINOPHIL NFR BLD AUTO: 2.3 %
ERYTHROCYTE [DISTWIDTH] IN BLOOD BY AUTOMATED COUNT: 12.9 % (ref 10–15)
GFR SERPL CREATININE-BSD FRML MDRD: 61 ML/MIN/{1.73_M2}
GLUCOSE SERPL-MCNC: 145 MG/DL (ref 70–99)
HCT VFR BLD AUTO: 42.5 % (ref 35–47)
HGB BLD-MCNC: 14.5 G/DL (ref 11.7–15.7)
IMM GRANULOCYTES # BLD: 0 10E9/L (ref 0–0.4)
IMM GRANULOCYTES NFR BLD: 0.4 %
INTERPRETATION ECG - MUSE: NORMAL
INTERPRETATION ECG - MUSE: NORMAL
LYMPHOCYTES # BLD AUTO: 2.2 10E9/L (ref 0.8–5.3)
LYMPHOCYTES NFR BLD AUTO: 23.3 %
MCH RBC QN AUTO: 32.2 PG (ref 26.5–33)
MCHC RBC AUTO-ENTMCNC: 34.1 G/DL (ref 31.5–36.5)
MCV RBC AUTO: 94 FL (ref 78–100)
MONOCYTES # BLD AUTO: 0.6 10E9/L (ref 0–1.3)
MONOCYTES NFR BLD AUTO: 6.3 %
NEUTROPHILS # BLD AUTO: 6.4 10E9/L (ref 1.6–8.3)
NEUTROPHILS NFR BLD AUTO: 67.3 %
NRBC # BLD AUTO: 0 10*3/UL
NRBC BLD AUTO-RTO: 0 /100
PLATELET # BLD AUTO: 338 10E9/L (ref 150–450)
POTASSIUM SERPL-SCNC: 3.5 MMOL/L (ref 3.4–5.3)
RBC # BLD AUTO: 4.5 10E12/L (ref 3.8–5.2)
SODIUM SERPL-SCNC: 138 MMOL/L (ref 133–144)
T4 FREE SERPL-MCNC: 1.17 NG/DL (ref 0.76–1.46)
TSH SERPL DL<=0.005 MIU/L-ACNC: 8.22 MU/L (ref 0.4–4)
WBC # BLD AUTO: 9.5 10E9/L (ref 4–11)

## 2021-06-30 PROCEDURE — 96374 THER/PROPH/DIAG INJ IV PUSH: CPT

## 2021-06-30 PROCEDURE — 250N000009 HC RX 250: Performed by: EMERGENCY MEDICINE

## 2021-06-30 PROCEDURE — 84439 ASSAY OF FREE THYROXINE: CPT | Performed by: EMERGENCY MEDICINE

## 2021-06-30 PROCEDURE — 80048 BASIC METABOLIC PNL TOTAL CA: CPT | Performed by: EMERGENCY MEDICINE

## 2021-06-30 PROCEDURE — 71045 X-RAY EXAM CHEST 1 VIEW: CPT

## 2021-06-30 PROCEDURE — 84443 ASSAY THYROID STIM HORMONE: CPT | Performed by: EMERGENCY MEDICINE

## 2021-06-30 PROCEDURE — 93005 ELECTROCARDIOGRAM TRACING: CPT | Mod: 76

## 2021-06-30 PROCEDURE — 85025 COMPLETE CBC W/AUTO DIFF WBC: CPT | Performed by: EMERGENCY MEDICINE

## 2021-06-30 PROCEDURE — 93005 ELECTROCARDIOGRAM TRACING: CPT

## 2021-06-30 PROCEDURE — 99285 EMERGENCY DEPT VISIT HI MDM: CPT | Mod: 25

## 2021-06-30 RX ORDER — PROPOFOL 10 MG/ML
1 INJECTION, EMULSION INTRAVENOUS ONCE
Status: DISCONTINUED | OUTPATIENT
Start: 2021-06-30 | End: 2021-06-30 | Stop reason: HOSPADM

## 2021-06-30 RX ORDER — DILTIAZEM HYDROCHLORIDE 5 MG/ML
10 INJECTION INTRAVENOUS ONCE
Status: COMPLETED | OUTPATIENT
Start: 2021-06-30 | End: 2021-06-30

## 2021-06-30 RX ADMIN — DILTIAZEM HYDROCHLORIDE 10 MG: 5 INJECTION, SOLUTION INTRAVENOUS at 07:54

## 2021-06-30 ASSESSMENT — ENCOUNTER SYMPTOMS
COLOR CHANGE: 1
HEMATURIA: 0
COUGH: 0
LIGHT-HEADEDNESS: 1
PALPITATIONS: 1
NAUSEA: 0
DIARRHEA: 0
DYSURIA: 0
VOMITING: 0
DIFFICULTY URINATING: 0
FREQUENCY: 0
FEVER: 0

## 2021-06-30 NOTE — ED NOTES
Patient ambulated to the bathroom independently without dizziness, shortness of breath or chest pain. Patient endorses feeling better than when she arrived.

## 2021-06-30 NOTE — ED NOTES
Patient and family provided discharge instructions. Patient endorses understanding of discharge instructions. Patient reports no pain or distress at time of discharge. VSS.

## 2021-06-30 NOTE — ED PROVIDER NOTES
"  History   Chief Complaint:  Palpitations       HPI   Brittani Ty is a 81 year old female on Eliquis with history of hypertension, hyperlipidemia, rheumatic heart disease, and A-fib who presents with palpitations. The patient states that she woke up at about 0300 this morning feeling \"jittery\" with tightness in her jaw, which are symptoms she typically has associated with her palpitations. She took metoprolol at about 0600 for this episode, which is an hour earlier than she normally takes it. She was in A-fib about a month ago, and she has had cardioversion in the past to correct her A-fib. Denies any specific triggers for these episodes. She does have a history of vertigo and felt a bit \"spacey\" yesterday, and takes meclizine for these occurrences. The patient also had a fall a week and a half ago which bruised her left leg, but she has been icing them regularly for the swelling and notes significant improvement. Had x-rays obtained at [facility] that were negative for fracture. She has been taking all of her medications regularly. The patient denies fevers, cough, and chest pain, as well as nausea, vomiting, diarrhea, and urinary symptoms.    Review of Systems   Constitutional: Negative for fever.   Respiratory: Negative for cough.    Cardiovascular: Positive for palpitations. Negative for chest pain.   Gastrointestinal: Negative for diarrhea, nausea and vomiting.   Genitourinary: Negative for difficulty urinating, dysuria, frequency, hematuria and urgency.   Skin: Positive for color change.   Neurological: Positive for light-headedness (since resolved).   All other systems reviewed and are negative.    Allergies:  Augmentin  Celecoxib  Lisinopril  Percocet  Propanolol  Sulindac  Rofecoxib  Vioxx    Medications:  Apixaban  Diltiazem LA  Amitriptyline  Atorvastatin  Calcitriol  Levothyroxine  Metoprolol succinate  Pravastatin    Past Medical History:    Aortic valve " disorder  Arthitis  Dyspepsia  Hyperlipidemia  Hypertension  Hypoparathyroidism  Mitral valve disorder  PONC  Rheumatic heart disease  Thyroid disease  A-fib   Hypercalcemia  Milk alkali syndrome    Past Surgical History:    Cholecystectomy  Colonoscopy  Head and neck surgery  Hysterectomy  Laser yag capsulotomy  Photoemulsification clear cornea with standard intraocular implant x2  Sphincterotomy rectum  Oophorectomy  Thyroidectomy  Parathyroidectomy  Knee arthroplasty    Family History:    Mother: colon cancer, leukemia  Father: prostate cancer, heart disease    Social History:  The patient presents to the ED with her son    Physical Exam     Patient Vitals for the past 24 hrs:   BP Temp Temp src Pulse Resp SpO2 Weight   06/30/21 1000 139/70 -- -- 66 -- 97 % --   06/30/21 0930 -- -- -- 68 -- 97 % --   06/30/21 0900 (!) 155/80 -- -- 79 -- 96 % --   06/30/21 0856 -- -- -- 76 -- 96 % --   06/30/21 0855 -- -- -- 81 -- 97 % --   06/30/21 0847 -- -- -- 76 -- 96 % --   06/30/21 0846 -- -- -- 80 -- 100 % --   06/30/21 0845 -- -- -- 125 -- 97 % --   06/30/21 0844 -- -- -- 111 -- 100 % --   06/30/21 0843 -- -- -- 116 -- 97 % --   06/30/21 0842 -- -- -- 111 -- 97 % --   06/30/21 0841 -- -- -- 130 -- 96 % --   06/30/21 0840 -- -- -- 115 -- 99 % --   06/30/21 0838 -- -- -- 129 -- 97 % --   06/30/21 0837 -- -- -- 133 -- 99 % --   06/30/21 0836 -- -- -- 133 -- 99 % --   06/30/21 0834 -- -- -- 133 -- 96 % --   06/30/21 0833 -- -- -- 129 -- 98 % --   06/30/21 0832 -- -- -- 126 -- 100 % --   06/30/21 0831 -- -- -- 131 -- 95 % --   06/30/21 0830 -- -- -- 134 -- 97 % --   06/30/21 0828 -- -- -- 135 -- 99 % --   06/30/21 0827 -- -- -- 134 -- 96 % --   06/30/21 0826 -- -- -- 138 -- 96 % --   06/30/21 0825 -- -- -- 123 -- 100 % --   06/30/21 0823 -- -- -- 130 -- 96 % --   06/30/21 0822 -- -- -- 116 -- 98 % --   06/30/21 0821 -- -- -- 135 -- 100 % --   06/30/21 0819 -- -- -- 124 -- 97 % --   06/30/21 0818 -- -- -- 133 -- 97 % --    06/30/21 0817 -- -- -- 134 -- 100 % --   06/30/21 0816 -- -- -- 116 -- 97 % --   06/30/21 0814 -- -- -- 128 -- 97 % --   06/30/21 0813 -- -- -- 122 -- 97 % --   06/30/21 0812 -- -- -- 124 -- 98 % --   06/30/21 0811 -- -- -- 112 -- 100 % --   06/30/21 0810 -- -- -- 133 -- 99 % --   06/30/21 0808 -- -- -- 121 -- 99 % --   06/30/21 0807 -- -- -- 109 -- 99 % --   06/30/21 0806 -- -- -- 133 -- 98 % --   06/30/21 0804 -- -- -- 115 -- 97 % --   06/30/21 0803 -- -- -- 123 -- 97 % --   06/30/21 0802 -- -- -- 121 -- 97 % --   06/30/21 0801 -- -- -- 118 -- 96 % --   06/30/21 0800 -- -- -- 126 -- 96 % --   06/30/21 0758 -- -- -- 125 -- 97 % --   06/30/21 0757 -- -- -- 112 -- 97 % --   06/30/21 0756 -- -- -- 125 -- 97 % --   06/30/21 0755 -- -- -- 113 -- 100 % --   06/30/21 0753 -- -- -- -- -- 100 % --   06/30/21 0752 -- -- -- -- -- 98 % --   06/30/21 0751 -- -- -- 120 -- 100 % --   06/30/21 0750 -- -- -- 133 -- 99 % --   06/30/21 0748 -- -- -- 123 -- 98 % --   06/30/21 0747 -- -- -- 131 -- 99 % --   06/30/21 0746 -- -- -- 117 -- 97 % --   06/30/21 0745 (!) 148/95 -- -- 140 -- 97 % --   06/30/21 0744 -- -- -- 135 -- 98 % --   06/30/21 0743 -- -- -- 124 -- 98 % --   06/30/21 0742 -- -- -- 129 -- 100 % --   06/30/21 0741 -- -- -- -- -- 100 % --   06/30/21 0740 -- -- -- -- -- 95 % --   06/30/21 0739 -- -- -- -- -- 96 % --   06/30/21 0738 (!) 148/95 -- -- 125 -- 98 % --   06/30/21 0737 -- -- -- 131 -- 100 % --   06/30/21 0736 -- -- -- -- -- 99 % --   06/30/21 0735 -- -- -- -- -- 100 % --   06/30/21 0734 -- -- -- -- -- 100 % --   06/30/21 0724 (!) 147/91 97  F (36.1  C) Temporal 123 16 100 % 78 kg (172 lb)       Physical Exam    HENT:   Mouth/Throat:  Oral mucosa moist.   Eyes: Conjunctivae are normal. No scleral icterus.  Neck: Neck supple. No cervical adenopathy  Cardiovascular: intact distal pulses. Tachycardic irregularly, irregular.   Pulmonary/Chest: Effort normal and breath sounds normal.   Abdominal: Soft.  No distension.  There is no tenderness.   Musculoskeletal:  No edema, No calf tenderness. Ecchymosis lateral left low leg.   Neurological:Alert and oriented. No facial asymmetry. Upper and lower extremity strength intact throughout. Coordination normal.   Skin: Skin is warm and dry.   Psychiatric: Normal mood and affect.     Emergency Department Course   ECG  ECG taken at 0733, ECG read at 0735  Atrial fibrillation with rapid ventricular response with premature ventricular or aberrantly conducted complexes  Possible Anterior infarct, age undetermined  Abnormal ECG   No change as compared to prior, dated 05/31/21.  Rate 127 bpm. AK interval * ms. QRS duration 82 ms. QT/QTc 318/462 ms. P-R-T axes * 24 35.     ECG #2  ECG taken at 0912, ECG read at 0915  Sinus rhythm with premature atrial complexes in a pattern of bigeminy  Otherwise normal ECG   No significant change as compared to prior, dated 06/30/21.  Rate 75 bpm. AK interval 196 ms. QRS duration 90 ms. QT/QTc 410/457 ms. P-R-T axes 75 3 17.     Imaging:  XR Chest Port 1 View  Portable chest. Lungs are clear. Heart is normal in size.  No pneumothorax. No definite pleural effusions.    BRANT TORRES MD    As per Radiology    Laboratory:  CBC: WBC 9.5, HGB 14.5,     BMP: glucose: 145 (H) o/w WNL (Creatinine 0.88)     TSH with free T4: 8.22 (H)    Free T4: 1.17    Emergency Department Course:    Reviewed:  I reviewed nursing notes, vitals, past medical history and care everywhere    Assessments:  0733 I obtained history and examined the patient as noted above.     0900 RN notified me that HR was 80's reviewed monitor. EKG ordered.     0949 I rechecked the patient and explained findings. We discussed discharge and the patient is comfortable going home.    Interventions:  0754 Diltiazem 10 mg IV    Disposition:  The patient was discharged to home.     Impression & Plan     Medical Decision Making:  Brittani Ty is a 81 year old female with known history of atrial fibrillation on  Eliquis presents with palpitations and typical symptoms of her A. fib with RVR.  On arrival she was noted to be in atrial fibrillation with rapid ventricular response.  She was otherwise hemodynamically stable.  She received diltiazem 10 mg IV and labs were drawn and sent to evaluate for an possible etiology.  Labs returned unremarkable.  The patient spontaneously cardioverted to a normal sinus rhythm.  She is feeling better.  With reasonable clinical certainty of her that she is safe for discharge home with ongoing evaluation and management as an outpatient.  I recommended that she contact her cardiologist today to arrange follow-up and discuss the 2 visits in the last few months.  She understands to return to the emergency department with any recurrent symptoms or new or worsening symptoms.    Diagnosis:    ICD-10-CM    1. Atrial fibrillation with RVR (H)  I48.91        Scribe Disclosure:  IMarcelino, am serving as a scribe at 7:32 AM on 6/30/2021 to document services personally performed by Sydnie Camejo MD based on my observations and the provider's statements to me.     IAbdoulaye, am serving as a scribe  at 10:16 AM on 6/30/2021 to document services personally performed by Sydnie Camejo MD based on my observations and the provider's statements to me.                Sydnie Camejo MD  06/30/21 2007

## 2021-07-06 ENCOUNTER — HOSPITAL ENCOUNTER (EMERGENCY)
Facility: CLINIC | Age: 81
Discharge: HOME | End: 2021-07-06
Attending: NURSE PRACTITIONER | Admitting: NURSE PRACTITIONER
Payer: COMMERCIAL

## 2021-07-06 VITALS
SYSTOLIC BLOOD PRESSURE: 164 MMHG | OXYGEN SATURATION: 96 % | TEMPERATURE: 97 F | DIASTOLIC BLOOD PRESSURE: 83 MMHG | RESPIRATION RATE: 16 BRPM | HEART RATE: 81 BPM | BODY MASS INDEX: 31.6 KG/M2 | WEIGHT: 170 LBS

## 2021-07-06 DIAGNOSIS — R03.0 ELEVATED BLOOD PRESSURE READING: ICD-10-CM

## 2021-07-06 PROCEDURE — 99283 EMERGENCY DEPT VISIT LOW MDM: CPT

## 2021-07-06 PROCEDURE — 93005 ELECTROCARDIOGRAM TRACING: CPT

## 2021-07-06 ASSESSMENT — ENCOUNTER SYMPTOMS
HEADACHES: 0
CHILLS: 0
WEAKNESS: 0
NAUSEA: 0
VOMITING: 0
FEVER: 0
NUMBNESS: 0
SHORTNESS OF BREATH: 0

## 2021-07-06 NOTE — ED TRIAGE NOTES
Patient states she has noted elevated BP for the past few days.      Tonight BP was 173/113.  No history of Htn.       gave her one of his flomax prior to leaving the house.      Patient has not discussed these pressures with her PMD.      Denies headache, dizziness or chest pain.      ABCs intact.  Alert and oriented x 4.

## 2021-07-07 LAB — INTERPRETATION ECG - MUSE: NORMAL

## 2021-07-07 NOTE — ED NOTES
Patient alert and oriented. Respirations even and unlabored. All discharge education given. All questions answered. Patient denies further needs and states that they are ready to leave. Patient ambulated out of the ER with steady gait.

## 2021-07-07 NOTE — DISCHARGE INSTRUCTIONS
Take your medications as directed.  As we discussed when you take your blood pressure medication he has sat in the appropriate position for the appropriate amount of time and to try to keep a log of your blood pressure at the same time daily for the next week.  I do recommend 2-3 times daily again at the same time.  Discussed these readings with your primary care provider.

## 2021-07-07 NOTE — ED PROVIDER NOTES
"  History   Chief Complaint:  Hypertension     HPI   Brittani Ty is a 81 year old female currently anticoagulated on Eliquis for atrial fibrillation with RVR with history of aortic and mitral valve disorder, thyroid disease, cholecystectomy, hypertension, hysterectomy, who presents with hypertension. For about the past week the patient notes that she has had increasing blood pressure. She \"felt antsy\" earlier this evening, prompting her to take her blood pressure which was 173/113 (well above her baseline blood pressure of 130s/70s). Her  gave her one of his medications, Bumex, prior to leaving the house to present here.     Here in the ED she denies any headache, nausea, chest pain, or vision changes. She disclosed that she has multiple recent stressors in her life, as she recently lost a family member, another family is having heart problems, and her  has dementia and seems to be worsening    Of note the patient was recently seen and discharged from the ED about one week ago for atrial fibrillation, and again about one month ago for atrial fibrillation.     Review of Systems   Constitutional: Negative for chills and fever.   Eyes: Negative for visual disturbance.   Respiratory: Negative for shortness of breath.    Cardiovascular: Negative for chest pain.   Gastrointestinal: Negative for nausea and vomiting.   Neurological: Negative for weakness, numbness and headaches.   All other systems reviewed and are negative.      Allergies:  Augmentin  Celecoxib  Percocet  Propranolol  Vioxx  Sulindac  Rofecoxib    Medications:  Elavil  Eliquis  Lipitor  Rocaltrol  Benadryl  Levothyroxine  Toprol  Miralax  Metamucil  Cardizem    Past Medical History:    Aortic Valve Disorder  Arthritis  Chest Pain  Dyspepsia  Hyperlipidemia  Hypertension  Hypoparathyroidism  Mitral Valve Disorder  GERD  Overweight  PONV  Rheumatic Heart Disease  Thyroid Disease  Vitamin D Deficiency  A-Fib   Poor Sleep  Stress due to Spouse " with Dementia    Past Surgical History:    Cholecystectomy  Parathyroid Tumor  Hysterectomy  Laser Yag Capsulotomy, bilateral  Phacoemulsification Clear Cornea with Standard Intraocular Lens Implant, bilateral  Sphincterotomy Rectum    Family History:    Mother - Colon Cancer    Social History:  Arrives with family    Physical Exam     Patient Vitals for the past 24 hrs:   BP Temp Temp src Pulse Resp SpO2 Weight   07/06/21 2030 -- -- -- -- -- 96 % --   07/06/21 2015 (!) 172/78 -- -- 81 -- 97 % --   07/06/21 2000 (!) 178/78 -- -- 80 -- 97 % --   07/06/21 1811 (!) 163/85 97  F (36.1  C) Temporal 84 16 95 % 77.1 kg (170 lb)       Physical Exam  General: Alert, No obvious discomfort, well kept  Eyes: PERRL, conjunctivae pink no scleral icterus or conjunctival injection  ENT:   Moist mucus membranes, posterior oropharynx clear without erythema or exudates, No lymphadenopathy, Normal voice  Resp:  Lungs clear to auscultation bilaterally, no crackles/rubs/wheezes. Good air movement  CV:  Normal rate and rhythm, no murmurs/rubs/gallops  GI:  Abdomen soft and non-distended.  Normoactive BS.  No tenderness, guarding or rebound, No masses  Skin:  Warm, dry.  No rashes or petechiae  Musculoskeletal: No peripheral edema or calf tenderness, Normal gross ROM   Neuro: Alert and oriented to person/place/time, normal sensation  Psychiatric: Normal affect, cooperative, good eye contact    Emergency Department Course   ECG (19:55:55):  Indication: Screening for cardiovascular disease.   Rate 90 bpm. OH interval 150. QRS duration 88. QT/QTc 396/484. P-R-T axes 31 12 37.   Interpretation: Sinus rhythm with premature atrial complexes. Nonspecific ST abnormality. Abnormal ECG.  Agree with computer interpretation.    Interpreted at 1958 by Dr. Adame.      Emergency Department Course:    Reviewed:  I reviewed nursing notes, vitals, past medical history and care everywhere    Assessments:  2005 I obtained history and examined the patient as  "noted above. I explained EKG.   2030 Set for discharge.    Disposition:  The patient was discharged to home.     Impression & Plan   Medical Decision Making:  Brittani Ty is a 81 year old female who presents today for evaluation of elevated blood pressure reading.  She notes that she felt \"antsy\" today therefore took her blood pressure and noted that it was continuing to rise.  She did not usually have a blood pressure with a diastolic number of 113 this was the number that concerned her the most and therefore presented for evaluation.  She does note that over the last week she has had gradually increasing stressors and has noticed that her blood pressure has continued to rise throughout the week.  She has had no symptoms concerning for hypertensive crisis or urgency.  She does state that her  is a retired pharmacist and that he has been seeming to have more symptoms associated with his dementia and have required more work at home on top of other life stressors.  She did however take one of his Bumex pills prior to coming to the emergency department noting that diuretics can help decrease her blood pressure.  She had no focal neurologic deficits.  After discussing signs and symptoms with her her blood pressure did start to decrease.  Again no indication for further testing or imaging.  There is no evidence of A. fib today.  She was reassured appropriate techniques to measure and monitor blood pressure were discussed.  She appears to be safe and appropriate for outpatient management follow-up and does have an upcoming appointment with her primary care provider.  Return protocols were discussed.  She appears to be safe and appropriate for outpatient follow-up and is discharged home.    Covid-19  Brittani Ty was evaluated during a global COVID-19 pandemic, which necessitated consideration that the patient might be at risk for infection with the SARS-CoV-2 virus that causes COVID-19.   Applicable protocols " for evaluation were followed during the patient's care.   COVID-19 was considered as part of the patient's evaluation.     Diagnosis:    ICD-10-CM    1. Elevated blood pressure reading  R03.0        Discharge Medications:  New Prescriptions    No medications on file       Scribe Disclosure:  Norah LOMBARDI, am serving as a scribe at 7:59 PM on 7/6/2021 to document services personally performed by Devon Mccauley APRN based on my observations and the provider's statements to me.     This note was completed in part using Dragon voice recognition software. Although reviewed after completion, some word and grammatical errors may occur.       Devon Mccauley APRN CNP  07/06/21 2135

## 2021-08-17 ENCOUNTER — HOSPITAL ENCOUNTER (EMERGENCY)
Facility: CLINIC | Age: 81
Discharge: HOME | End: 2021-08-18
Attending: EMERGENCY MEDICINE | Admitting: EMERGENCY MEDICINE
Payer: COMMERCIAL

## 2021-08-17 DIAGNOSIS — I48.91 ATRIAL FIBRILLATION WITH RAPID VENTRICULAR RESPONSE (H): ICD-10-CM

## 2021-08-17 DIAGNOSIS — E87.6 HYPOKALEMIA: ICD-10-CM

## 2021-08-17 LAB
ANION GAP SERPL CALCULATED.3IONS-SCNC: 8 MMOL/L (ref 3–14)
BASOPHILS # BLD AUTO: 0 10E3/UL (ref 0–0.2)
BASOPHILS NFR BLD AUTO: 0 %
BUN SERPL-MCNC: 11 MG/DL (ref 7–30)
CALCIUM SERPL-MCNC: 8.3 MG/DL (ref 8.5–10.1)
CHLORIDE BLD-SCNC: 103 MMOL/L (ref 94–109)
CO2 SERPL-SCNC: 25 MMOL/L (ref 20–32)
CREAT SERPL-MCNC: 1.07 MG/DL (ref 0.52–1.04)
EOSINOPHIL # BLD AUTO: 0.3 10E3/UL (ref 0–0.7)
EOSINOPHIL NFR BLD AUTO: 3 %
ERYTHROCYTE [DISTWIDTH] IN BLOOD BY AUTOMATED COUNT: 12.6 % (ref 10–15)
GFR SERPL CREATININE-BSD FRML MDRD: 48 ML/MIN/1.73M2
GLUCOSE BLD-MCNC: 139 MG/DL (ref 70–99)
HCT VFR BLD AUTO: 40.9 % (ref 35–47)
HGB BLD-MCNC: 14.4 G/DL (ref 11.7–15.7)
HOLD SPECIMEN: NORMAL
IMM GRANULOCYTES # BLD: 0 10E3/UL
IMM GRANULOCYTES NFR BLD: 0 %
LYMPHOCYTES # BLD AUTO: 3.2 10E3/UL (ref 0.8–5.3)
LYMPHOCYTES NFR BLD AUTO: 33 %
MCH RBC QN AUTO: 33 PG (ref 26.5–33)
MCHC RBC AUTO-ENTMCNC: 35.2 G/DL (ref 31.5–36.5)
MCV RBC AUTO: 94 FL (ref 78–100)
MONOCYTES # BLD AUTO: 0.9 10E3/UL (ref 0–1.3)
MONOCYTES NFR BLD AUTO: 10 %
NEUTROPHILS # BLD AUTO: 5.1 10E3/UL (ref 1.6–8.3)
NEUTROPHILS NFR BLD AUTO: 54 %
NRBC # BLD AUTO: 0 10E3/UL
NRBC BLD AUTO-RTO: 0 /100
PLATELET # BLD AUTO: 260 10E3/UL (ref 150–450)
POTASSIUM BLD-SCNC: 3 MMOL/L (ref 3.4–5.3)
RBC # BLD AUTO: 4.37 10E6/UL (ref 3.8–5.2)
SODIUM SERPL-SCNC: 136 MMOL/L (ref 133–144)
TROPONIN I SERPL-MCNC: <0.015 UG/L (ref 0–0.04)
WBC # BLD AUTO: 9.6 10E3/UL (ref 4–11)

## 2021-08-17 PROCEDURE — 84484 ASSAY OF TROPONIN QUANT: CPT | Performed by: EMERGENCY MEDICINE

## 2021-08-17 PROCEDURE — 999N000157 HC STATISTIC RCP TIME EA 10 MIN

## 2021-08-17 PROCEDURE — 96374 THER/PROPH/DIAG INJ IV PUSH: CPT

## 2021-08-17 PROCEDURE — 99285 EMERGENCY DEPT VISIT HI MDM: CPT | Mod: 25

## 2021-08-17 PROCEDURE — 250N000011 HC RX IP 250 OP 636: Performed by: EMERGENCY MEDICINE

## 2021-08-17 PROCEDURE — 92960 CARDIOVERSION ELECTRIC EXT: CPT

## 2021-08-17 PROCEDURE — 36415 COLL VENOUS BLD VENIPUNCTURE: CPT | Performed by: EMERGENCY MEDICINE

## 2021-08-17 PROCEDURE — 85004 AUTOMATED DIFF WBC COUNT: CPT | Performed by: EMERGENCY MEDICINE

## 2021-08-17 PROCEDURE — 250N000009 HC RX 250: Performed by: EMERGENCY MEDICINE

## 2021-08-17 PROCEDURE — 80048 BASIC METABOLIC PNL TOTAL CA: CPT | Performed by: EMERGENCY MEDICINE

## 2021-08-17 PROCEDURE — 999N000055 HC STATISTIC END TITIAL CO2 MONITORING

## 2021-08-17 PROCEDURE — 250N000013 HC RX MED GY IP 250 OP 250 PS 637: Performed by: EMERGENCY MEDICINE

## 2021-08-17 PROCEDURE — 96365 THER/PROPH/DIAG IV INF INIT: CPT | Mod: 59

## 2021-08-17 PROCEDURE — 999N000156 HC STATISTIC RCP CONSULT EA 30 MIN

## 2021-08-17 PROCEDURE — 93005 ELECTROCARDIOGRAM TRACING: CPT | Mod: 59

## 2021-08-17 PROCEDURE — 999N000043 HC STATISTIC CTO2 CONT OXYGEN TECH TIME EA 90 MIN

## 2021-08-17 RX ORDER — POTASSIUM CHLORIDE 7.45 MG/ML
10 INJECTION INTRAVENOUS ONCE
Status: COMPLETED | OUTPATIENT
Start: 2021-08-17 | End: 2021-08-18

## 2021-08-17 RX ORDER — POTASSIUM CHLORIDE 1500 MG/1
40 TABLET, EXTENDED RELEASE ORAL ONCE
Status: COMPLETED | OUTPATIENT
Start: 2021-08-17 | End: 2021-08-17

## 2021-08-17 RX ORDER — LIDOCAINE 40 MG/G
CREAM TOPICAL
Status: DISCONTINUED | OUTPATIENT
Start: 2021-08-17 | End: 2021-08-18 | Stop reason: HOSPADM

## 2021-08-17 RX ORDER — DILTIAZEM HYDROCHLORIDE 5 MG/ML
10 INJECTION INTRAVENOUS ONCE
Status: COMPLETED | OUTPATIENT
Start: 2021-08-17 | End: 2021-08-17

## 2021-08-17 RX ORDER — PROPOFOL 10 MG/ML
1 INJECTION, EMULSION INTRAVENOUS ONCE
Status: COMPLETED | OUTPATIENT
Start: 2021-08-17 | End: 2021-08-17

## 2021-08-17 RX ADMIN — DILTIAZEM HYDROCHLORIDE 10 MG: 5 INJECTION, SOLUTION INTRAVENOUS at 21:03

## 2021-08-17 RX ADMIN — PROPOFOL 70 MG: 10 INJECTION, EMULSION INTRAVENOUS at 22:48

## 2021-08-17 RX ADMIN — POTASSIUM CHLORIDE 40 MEQ: 1500 TABLET, EXTENDED RELEASE ORAL at 23:25

## 2021-08-17 RX ADMIN — POTASSIUM CHLORIDE 10 MEQ: 7.46 INJECTION, SOLUTION INTRAVENOUS at 23:25

## 2021-08-17 ASSESSMENT — MIFFLIN-ST. JEOR: SCORE: 1247.4

## 2021-08-17 ASSESSMENT — ENCOUNTER SYMPTOMS: PALPITATIONS: 1

## 2021-08-18 VITALS
RESPIRATION RATE: 20 BRPM | OXYGEN SATURATION: 97 % | BODY MASS INDEX: 34.04 KG/M2 | HEART RATE: 86 BPM | HEIGHT: 62 IN | WEIGHT: 185 LBS | DIASTOLIC BLOOD PRESSURE: 92 MMHG | TEMPERATURE: 98.3 F | SYSTOLIC BLOOD PRESSURE: 154 MMHG

## 2021-08-18 LAB
ATRIAL RATE - MUSE: 106 BPM
ATRIAL RATE - MUSE: 178 BPM
DIASTOLIC BLOOD PRESSURE - MUSE: NORMAL MMHG
DIASTOLIC BLOOD PRESSURE - MUSE: NORMAL MMHG
INTERPRETATION ECG - MUSE: NORMAL
INTERPRETATION ECG - MUSE: NORMAL
P AXIS - MUSE: 70 DEGREES
P AXIS - MUSE: NORMAL DEGREES
PR INTERVAL - MUSE: 190 MS
PR INTERVAL - MUSE: NORMAL MS
QRS DURATION - MUSE: 86 MS
QRS DURATION - MUSE: 86 MS
QT - MUSE: 290 MS
QT - MUSE: 370 MS
QTC - MUSE: 437 MS
QTC - MUSE: 491 MS
R AXIS - MUSE: 14 DEGREES
R AXIS - MUSE: 47 DEGREES
SYSTOLIC BLOOD PRESSURE - MUSE: NORMAL MMHG
SYSTOLIC BLOOD PRESSURE - MUSE: NORMAL MMHG
T AXIS - MUSE: -56 DEGREES
T AXIS - MUSE: 57 DEGREES
VENTRICULAR RATE- MUSE: 106 BPM
VENTRICULAR RATE- MUSE: 137 BPM

## 2021-08-18 ASSESSMENT — ENCOUNTER SYMPTOMS
FEVER: 0
CHEST TIGHTNESS: 0

## 2021-08-18 NOTE — PROGRESS NOTES
"Procedural sedation for cardioversion .   Pt tolerated well. ETCO2 12-27 .   Vitals stable no issues.     BP (!) 165/100   Pulse (!) 146   Temp 98.3  F (36.8  C) (Oral)   Resp 18   Ht 1.575 m (5' 2\")   Wt 83.9 kg (185 lb)   SpO2 96%   Breastfeeding No   BMI 33.84 kg/m      Nick Reesr, RT    "

## 2021-08-18 NOTE — ED PROVIDER NOTES
History   Chief Complaint:  Irregular Heart Beat       HPI   Brittani Ty is a 81 year old female on Eliquis with history of hypertension and atrial fibrillaiton who presents with irregular heart beat. Patient reports she was sitting on the couch approximately 3 hours ago when she felt her heart begin to race. Along with a racing heart, she can feel it in her teeth/jaw and feels jittery. Patient has been to the emergency department 3 times in the past 4 months for atrial fibrillation and has had a couple of cardioversion's that have worked in the past. Yesterday her cardiologist prescribed Amiodarone and has taken 3 total since yesterday. She is not on any rate controlling medications. Patient last ate a meal 3 to 3 and a half hours ago. She notes this episode of atrial fibrillation is worse than previous episodes and notes her breathing feels okay.    Review of Systems   Constitutional: Negative for fever.   Respiratory: Negative for chest tightness.    Cardiovascular: Positive for palpitations.   All other systems reviewed and are negative.        Allergies:  Augmentin   Celecoxib  Lisinopril  Percocet   Propranolol  Sulindac  Vioxx  Rofecoxib    Medications:  Elavil  Eliquis  Lipitor  Rocaltrol  Diltiazem  Benadryl  Levothyroxine  Toprol-XL  Miralax  Pravachol  Amoxil  Cardizem CD  Amiodarone    Past Medical History:    Aortic valve disorder  Arthritis  Dyspepsia  Hyperlipidemia  Hypertension  Hypoparathyroidism  Mitral valve disorder  Rheumatic heart disease  Thyroid disease  Vitamin D deficiency  Atrial fibrillation  Stress due to spouse with dementia  Spondylosis of cervical region without myelopathy or radiculopathy  Syncope  Essential tremor  Normal DEXA  Liver nodule benign angiomas   Abnormal glucose  Osteoarth NOS  Displacement of lumbar intervertebral disc without myelopathy  Hypothyroidism  Esophageal stricture/EGD/gastric polyp  Arteriosclerotic heart disease mild disease  Hypercalcemia  Milk alkali  "syndrome  Hypervitaminosis D  Diverticulosis Colon  Osteoarthritis  DJD left knee    Past Surgical History:    Oophorectomy  Cholecystectomy  Colonoscopy x 2  Exam under anesthesia, partial sphincterotomy  Parathyroid tumor surgery  Hysterectomy  Laser YAG capsulotomy x 2   Bilateral phacoemulsification clear cornea with standard intraocular lens implant  Thyroidectomy  Parathyroidectomy  (IA) [place] total knee arthroplasty    Family History:    Colon cancer  Prostate cancer  MI  Heart disease  Leukemia    Social History:  Patient presents with family member.     Physical Exam     Patient Vitals for the past 24 hrs:   BP Temp Temp src Pulse Resp SpO2 Height Weight   08/18/21 0000 (!) 153/83 -- -- 87 20 97 % -- --   08/17/21 2345 (!) 160/74 -- -- 91 -- 97 % -- --   08/17/21 2330 (!) 143/74 -- -- 97 -- 97 % -- --   08/17/21 2315 128/68 -- -- 98 -- 97 % -- --   08/17/21 2309 -- -- -- 98 -- 97 % -- --   08/17/21 2308 -- -- -- 101 -- 97 % -- --   08/17/21 2305 (!) 144/93 -- -- (!) 189 -- 98 % -- --   08/17/21 2300 (!) 148/69 -- -- 105 -- 95 % -- --   08/17/21 2255 (!) 153/81 -- -- (!) 146 -- 97 % -- --   08/17/21 2247 -- -- -- (!) 146 -- 96 % -- --   08/17/21 2215 (!) 165/100 -- -- (!) 128 18 97 % -- --   08/17/21 2200 (!) 163/138 -- -- (!) 138 18 95 % -- --   08/17/21 2145 (!) 152/133 -- -- (!) 134 16 95 % -- --   08/17/21 2115 (!) 185/92 -- -- (!) 135 -- 96 % -- --   08/17/21 2100 (!) 184/106 -- -- (!) 125 -- 97 % -- --   08/17/21 2045 (!) 172/96 -- -- (!) 133 -- 97 % -- --   08/17/21 2027 (!) 125/96 98.3  F (36.8  C) Oral 120 20 99 % 1.575 m (5' 2\") 83.9 kg (185 lb)       Physical Exam  HENT:    Mouth/Throat: Oral mucosa moist.    Eyes: Conjunctivae are normal. No scleral icterus.  Neck: Neck supple. No cervical adenopathy  Cardiovascular: Intact distal pulses. Tachycardiac irregularly irregular.  Pulmonary/Chest: Effort normal and breath sounds normal.   Abdominal: Soft.  No distension. There is no tenderness. "   Musculoskeletal:  No edema, No calf tenderness  Neurological:Alert and oriented. Coordination normal.   Skin: Skin is warm and dry.   Psychiatric: Normal mood and affect.     Emergency Department Course   ECG  ECG taken at 2021, ECG read at 2052  Atrial fibrillation with rapid ventricular response  Marked ST abnormality, possible lateral subendocardial injury   Rate 137 bpm. ND interval * ms. QRS duration 86 ms. QT/QTc 290/437 ms. P-R-T axes * 47 -56.     ECG  ECG taken at 2301, ECG read at 2301  Sinus tachycardia with premature atrial complexes  Nonspecific ST abnormality   Rate 106 bpm. ND interval 190 ms. QRS duration 86 ms. QT/QTc 370/491 ms. P-R-T axes 70 14 57.     Laboratory:  BMP: Potassium 3.0 (L), Creatinine 1.07 (H), Calcium 8.3 (L), Glucose 139 (H), GFR Estimate 48 (L) o/w WNL   Troponin (Collected 2049): <0.015  CBC: WBC 9.6, HGB 14.4,      Procedures  Electrical Cardioversion          Indication:  Atrial Fibrilation        Consent:  Risks (including but not limited to: arrhythmia, stroke or death),  benefits and alternatives were discussed with patient and consent for procedure was obtained.      Timeout:  Universal protocol was followed.  TIME OUT conducted just    Prior to starting procedure confirmed patient identity, site/side, procedure    Patient position, and availability of correct equipment and implants:    Yes      Medication: 70 mg Propofol (Diprivan)      Procedure note:  Electrodes were placed  in the anterior position,   Trial 1:  Synchronized shock at  100 was successful      Patient Status:  Patient tolerated the procedure well.  There were no complications.     Emergency Department Course:    Reviewed:  I reviewed nursing notes, vitals, past medical history and care everywhere    Assessments:  2049 I obtained history and examined the patient as noted above.   2232 I rechecked the patient and explained findings.   2254 I preformed the cardioversion.  0010 I rechecked the  patient.     Interventions:  2103 Cardizem 10 mg IV  2248 Diprivan 70 mg IV  2325 Klor-con M 40 mEq PO  2325 Premix 10 mEq IV    Disposition:  The patient was discharged to home.       Impression & Plan       Medical Decision Making:  Brittani Ty is a 81 year old female with a history of paroxysmal atrial fibrillation who presents with acute onset of palpitations as described above.  On arrival she was noted to be in atrial fibrillation with RVR.  She was hemodynamically stable.  IV was placed labs are drawn and sent and she was given diltiazem 10 mg IV.  She was noted to have hypokalemia but I did not feel that replacement of this would resolve her arrhythmia therefore she underwent procedural sedation and cardioversion after informed consent was obtained.  Patient tolerated the sedation and cardioversion without complication.  She did receive oral and IV potassium in the emergency department.  She states she has not tolerated oral potassium in general at home and therefore information on potassium in the diet was provided.  I have recommended close follow-up for repeat potassium in the next few days and return to her release consultation with her cardiologist again for further guidance on her atrial fibrillation as she continues to have questions about this.  She understands to return with new or worsening symptoms or if she has recurrence of her atrial fibrillation.        Covid-19  Brittani Ty was evaluated during a global COVID-19 pandemic, which necessitated consideration that the patient might be at risk for infection with the SARS-CoV-2 virus that causes COVID-19.   Applicable protocols for evaluation were followed during the patient's care.     Diagnosis:    ICD-10-CM    1. Atrial fibrillation with rapid ventricular response (H)  I48.91    2. Hypokalemia  E87.6        Discharge Medications:  New Prescriptions    No medications on file       Scribe Disclosure:  Jennifer LOMBARDI, am serving as a scribe at  8:49 PM on 8/17/2021 to document services personally performed by Sydnie Camejo based on my observations and the provider's statements to me.            Sydnie Camejo MD  08/18/21 0136

## 2021-08-18 NOTE — ED TRIAGE NOTES
Pt presents for evaluation of A-fib. Pt noticed her HR racing about an hour ago. Hx of A-fib requiring cardioversion. Pt also notes a funny feeling in the jaw when in A-fib. Denies shortness of breath, but feels shaky.

## 2021-10-18 ENCOUNTER — HOSPITAL ENCOUNTER (OUTPATIENT)
Dept: MAMMOGRAPHY | Facility: CLINIC | Age: 81
Discharge: HOME | End: 2021-10-18
Attending: INTERNAL MEDICINE | Admitting: INTERNAL MEDICINE
Payer: COMMERCIAL

## 2021-10-18 DIAGNOSIS — Z12.31 VISIT FOR SCREENING MAMMOGRAM: ICD-10-CM

## 2021-10-18 PROCEDURE — 77063 BREAST TOMOSYNTHESIS BI: CPT

## 2021-10-30 ENCOUNTER — HEALTH MAINTENANCE LETTER (OUTPATIENT)
Age: 81
End: 2021-10-30

## 2021-11-09 ENCOUNTER — LAB REQUISITION (OUTPATIENT)
Dept: LAB | Facility: CLINIC | Age: 81
End: 2021-11-09
Payer: COMMERCIAL

## 2021-11-09 DIAGNOSIS — U07.1 COVID-19: ICD-10-CM

## 2021-11-09 PROCEDURE — U0003 INFECTIOUS AGENT DETECTION BY NUCLEIC ACID (DNA OR RNA); SEVERE ACUTE RESPIRATORY SYNDROME CORONAVIRUS 2 (SARS-COV-2) (CORONAVIRUS DISEASE [COVID-19]), AMPLIFIED PROBE TECHNIQUE, MAKING USE OF HIGH THROUGHPUT TECHNOLOGIES AS DESCRIBED BY CMS-2020-01-R: HCPCS | Mod: ORL | Performed by: FAMILY MEDICINE

## 2021-11-11 LAB — SARS-COV-2 RNA RESP QL NAA+PROBE: NEGATIVE

## 2021-11-15 ENCOUNTER — LAB REQUISITION (OUTPATIENT)
Dept: LAB | Facility: CLINIC | Age: 81
End: 2021-11-15
Payer: COMMERCIAL

## 2021-11-15 DIAGNOSIS — U07.1 COVID-19: ICD-10-CM

## 2021-11-16 PROCEDURE — U0005 INFEC AGEN DETEC AMPLI PROBE: HCPCS | Mod: ORL | Performed by: FAMILY MEDICINE

## 2021-11-17 LAB — SARS-COV-2 RNA RESP QL NAA+PROBE: NEGATIVE

## 2021-11-19 ENCOUNTER — LAB REQUISITION (OUTPATIENT)
Dept: LAB | Facility: CLINIC | Age: 81
End: 2021-11-19
Payer: COMMERCIAL

## 2021-11-19 DIAGNOSIS — U07.1 COVID-19: ICD-10-CM

## 2021-11-23 PROCEDURE — U0003 INFECTIOUS AGENT DETECTION BY NUCLEIC ACID (DNA OR RNA); SEVERE ACUTE RESPIRATORY SYNDROME CORONAVIRUS 2 (SARS-COV-2) (CORONAVIRUS DISEASE [COVID-19]), AMPLIFIED PROBE TECHNIQUE, MAKING USE OF HIGH THROUGHPUT TECHNOLOGIES AS DESCRIBED BY CMS-2020-01-R: HCPCS | Mod: ORL | Performed by: FAMILY MEDICINE

## 2021-11-24 LAB — SARS-COV-2 RNA RESP QL NAA+PROBE: NEGATIVE

## 2021-11-29 ENCOUNTER — LAB REQUISITION (OUTPATIENT)
Dept: LAB | Facility: CLINIC | Age: 81
End: 2021-11-29
Payer: COMMERCIAL

## 2021-11-29 DIAGNOSIS — U07.1 COVID-19: ICD-10-CM

## 2021-11-30 PROCEDURE — U0005 INFEC AGEN DETEC AMPLI PROBE: HCPCS | Mod: ORL | Performed by: FAMILY MEDICINE

## 2021-12-01 LAB
SARS-COV-2 RNA RESP QL NAA+PROBE: NORMAL
SARS-COV-2 RNA RESP QL NAA+PROBE: NOT DETECTED

## 2021-12-03 ENCOUNTER — LAB REQUISITION (OUTPATIENT)
Dept: LAB | Facility: CLINIC | Age: 81
End: 2021-12-03
Payer: COMMERCIAL

## 2021-12-03 DIAGNOSIS — U07.1 COVID-19: ICD-10-CM

## 2021-12-07 PROCEDURE — U0003 INFECTIOUS AGENT DETECTION BY NUCLEIC ACID (DNA OR RNA); SEVERE ACUTE RESPIRATORY SYNDROME CORONAVIRUS 2 (SARS-COV-2) (CORONAVIRUS DISEASE [COVID-19]), AMPLIFIED PROBE TECHNIQUE, MAKING USE OF HIGH THROUGHPUT TECHNOLOGIES AS DESCRIBED BY CMS-2020-01-R: HCPCS | Mod: ORL | Performed by: FAMILY MEDICINE

## 2021-12-08 LAB — SARS-COV-2 RNA RESP QL NAA+PROBE: NEGATIVE

## 2022-10-22 ENCOUNTER — HEALTH MAINTENANCE LETTER (OUTPATIENT)
Age: 82
End: 2022-10-22

## 2022-10-31 ENCOUNTER — HOSPITAL ENCOUNTER (OUTPATIENT)
Dept: MAMMOGRAPHY | Facility: CLINIC | Age: 82
Discharge: HOME | End: 2022-10-31
Attending: INTERNAL MEDICINE | Admitting: INTERNAL MEDICINE
Payer: COMMERCIAL

## 2022-10-31 DIAGNOSIS — Z12.31 VISIT FOR SCREENING MAMMOGRAM: ICD-10-CM

## 2022-10-31 PROCEDURE — 77067 SCR MAMMO BI INCL CAD: CPT

## 2022-11-04 ENCOUNTER — APPOINTMENT (OUTPATIENT)
Dept: GENERAL RADIOLOGY | Facility: CLINIC | Age: 82
End: 2022-11-04
Attending: EMERGENCY MEDICINE
Payer: COMMERCIAL

## 2022-11-04 ENCOUNTER — HOSPITAL ENCOUNTER (EMERGENCY)
Facility: CLINIC | Age: 82
Discharge: HOME | End: 2022-11-04
Attending: EMERGENCY MEDICINE | Admitting: EMERGENCY MEDICINE
Payer: COMMERCIAL

## 2022-11-04 VITALS
RESPIRATION RATE: 24 BRPM | DIASTOLIC BLOOD PRESSURE: 73 MMHG | BODY MASS INDEX: 34.6 KG/M2 | SYSTOLIC BLOOD PRESSURE: 166 MMHG | TEMPERATURE: 97.3 F | OXYGEN SATURATION: 97 % | HEART RATE: 53 BPM | WEIGHT: 189.15 LBS

## 2022-11-04 DIAGNOSIS — R00.1 BRADYCARDIA: ICD-10-CM

## 2022-11-04 LAB
ALBUMIN UR-MCNC: NEGATIVE MG/DL
ANION GAP SERPL CALCULATED.3IONS-SCNC: 13 MMOL/L (ref 7–15)
APPEARANCE UR: CLEAR
BASOPHILS # BLD AUTO: 0 10E3/UL (ref 0–0.2)
BASOPHILS NFR BLD AUTO: 0 %
BILIRUB UR QL STRIP: NEGATIVE
BUN SERPL-MCNC: 36 MG/DL (ref 8–23)
CALCIUM SERPL-MCNC: 8.9 MG/DL (ref 8.8–10.2)
CHLORIDE SERPL-SCNC: 99 MMOL/L (ref 98–107)
COLOR UR AUTO: NORMAL
CREAT SERPL-MCNC: 1.27 MG/DL (ref 0.51–0.95)
DEPRECATED HCO3 PLAS-SCNC: 24 MMOL/L (ref 22–29)
EOSINOPHIL # BLD AUTO: 0 10E3/UL (ref 0–0.7)
EOSINOPHIL NFR BLD AUTO: 0 %
ERYTHROCYTE [DISTWIDTH] IN BLOOD BY AUTOMATED COUNT: 12.8 % (ref 10–15)
GFR SERPL CREATININE-BSD FRML MDRD: 41 ML/MIN/1.73M2
GLUCOSE SERPL-MCNC: 109 MG/DL (ref 70–99)
GLUCOSE UR STRIP-MCNC: NEGATIVE MG/DL
HCT VFR BLD AUTO: 46.5 % (ref 35–47)
HGB BLD-MCNC: 15.2 G/DL (ref 11.7–15.7)
HGB UR QL STRIP: NEGATIVE
HOLD SPECIMEN: NORMAL
IMM GRANULOCYTES # BLD: 0.2 10E3/UL
IMM GRANULOCYTES NFR BLD: 1 %
INR PPP: 1.2 (ref 0.85–1.15)
KETONES UR STRIP-MCNC: NEGATIVE MG/DL
LEUKOCYTE ESTERASE UR QL STRIP: NEGATIVE
LYMPHOCYTES # BLD AUTO: 2.1 10E3/UL (ref 0.8–5.3)
LYMPHOCYTES NFR BLD AUTO: 12 %
MAGNESIUM SERPL-MCNC: 2.6 MG/DL (ref 1.7–2.3)
MCH RBC QN AUTO: 33 PG (ref 26.5–33)
MCHC RBC AUTO-ENTMCNC: 32.7 G/DL (ref 31.5–36.5)
MCV RBC AUTO: 101 FL (ref 78–100)
MONOCYTES # BLD AUTO: 1.4 10E3/UL (ref 0–1.3)
MONOCYTES NFR BLD AUTO: 8 %
NEUTROPHILS # BLD AUTO: 14 10E3/UL (ref 1.6–8.3)
NEUTROPHILS NFR BLD AUTO: 79 %
NITRATE UR QL: NEGATIVE
NRBC # BLD AUTO: 0 10E3/UL
NRBC BLD AUTO-RTO: 0 /100
PH UR STRIP: 6 [PH] (ref 5–7)
PLATELET # BLD AUTO: 320 10E3/UL (ref 150–450)
POTASSIUM SERPL-SCNC: 4.6 MMOL/L (ref 3.4–5.3)
RBC # BLD AUTO: 4.61 10E6/UL (ref 3.8–5.2)
RBC URINE: 0 /HPF
SODIUM SERPL-SCNC: 136 MMOL/L (ref 136–145)
SP GR UR STRIP: 1.01 (ref 1–1.03)
T4 FREE SERPL-MCNC: 1.74 NG/DL (ref 0.9–1.7)
TROPONIN T SERPL HS-MCNC: 13 NG/L
TSH SERPL DL<=0.005 MIU/L-ACNC: 8.1 UIU/ML (ref 0.3–4.2)
UROBILINOGEN UR STRIP-MCNC: NORMAL MG/DL
WBC # BLD AUTO: 17.7 10E3/UL (ref 4–11)
WBC URINE: 1 /HPF

## 2022-11-04 PROCEDURE — 36415 COLL VENOUS BLD VENIPUNCTURE: CPT | Performed by: EMERGENCY MEDICINE

## 2022-11-04 PROCEDURE — 82310 ASSAY OF CALCIUM: CPT | Performed by: EMERGENCY MEDICINE

## 2022-11-04 PROCEDURE — 85004 AUTOMATED DIFF WBC COUNT: CPT | Performed by: EMERGENCY MEDICINE

## 2022-11-04 PROCEDURE — 84484 ASSAY OF TROPONIN QUANT: CPT | Performed by: EMERGENCY MEDICINE

## 2022-11-04 PROCEDURE — 99285 EMERGENCY DEPT VISIT HI MDM: CPT | Mod: 25

## 2022-11-04 PROCEDURE — 83735 ASSAY OF MAGNESIUM: CPT | Performed by: EMERGENCY MEDICINE

## 2022-11-04 PROCEDURE — 93005 ELECTROCARDIOGRAM TRACING: CPT

## 2022-11-04 PROCEDURE — 85610 PROTHROMBIN TIME: CPT | Performed by: EMERGENCY MEDICINE

## 2022-11-04 PROCEDURE — 84439 ASSAY OF FREE THYROXINE: CPT | Performed by: EMERGENCY MEDICINE

## 2022-11-04 PROCEDURE — 81001 URINALYSIS AUTO W/SCOPE: CPT | Performed by: EMERGENCY MEDICINE

## 2022-11-04 PROCEDURE — 71046 X-RAY EXAM CHEST 2 VIEWS: CPT

## 2022-11-04 PROCEDURE — 84443 ASSAY THYROID STIM HORMONE: CPT | Performed by: EMERGENCY MEDICINE

## 2022-11-04 ASSESSMENT — ACTIVITIES OF DAILY LIVING (ADL): ADLS_ACUITY_SCORE: 35

## 2022-11-04 NOTE — ED TRIAGE NOTES
Pt reports that she lives in assisted living and yesterday her nurse took her pulse and noted that her pulse was in the 40's. PT reports that she has A.Fib and is on metoprolol and amiodarone. PT denies CP or SOB. VSS and ABC's intact

## 2022-11-04 NOTE — ED PROVIDER NOTES
History     Chief Complaint:  Bradycardia       HPI   Brittani Ty is a 82 year old female who presents with low heart rate. She stated that she she was woken up tonight for heart rate in the 40s.  She stated that she wears a apple watch and notifies her stuff like this.  Her normal heart rate has been in the 50s.  She takes both metoprolol and amiodarone for her atrial fibrillation.  She denies any other symptoms.  She states everything has been the same without any medication changes.  She has not been taking extra medications.  She denies any lightheadedness or dizziness during these episodes.  She denies any chest pain or any symptoms currently.  She states that she feels fine other than she was told to come in due to the low heart rate.    ROS:  Review of Systems  Please see HPI. All other systems reviewed and negative.      Allergies:  Augmentin [Amoxicillin-Pot Clavulanate]  Celecoxib  Lisinopril  Percocet [Oxycodone-Acetaminophen]  Primidone  Propranolol  Sulindac  Vioxx     Medications:    amitriptyline (ELAVIL) 25 MG tablet  apixaban ANTICOAGULANT (ELIQUIS) 5 MG tablet  atorvastatin (LIPITOR) 10 MG tablet  calcitRIOL (ROCALTROL) 0.5 MCG capsule  diltiazem 180 MG 24 hr CD capsule  diphenhydrAMINE (BENADRYL) 25 MG tablet  levothyroxine (SYNTHROID, LEVOTHROID) 44 MCG tablet  levothyroxine (SYNTHROID, LEVOTHROID) 88 MCG tablet  metoprolol succinate ER (TOPROL-XL) 50 MG 24 hr tablet  Omega-3 Fatty Acids (OMEGA-3 FISH OIL PO)  polyethylene glycol (MIRALAX) powder  psyllium (METAMUCIL) 58.6 % POWD        Past Medical History:    Past Medical History:   Diagnosis Date     Aortic valve disorder      Arthritis      Chest pain, unspecified      Dyspepsia      Hyperlipidemia      Hypertension      Hypoparathyroidism (H)      Mitral valve disorder      Overweight(278.02)      PONV (postoperative nausea and vomiting)      Rheumatic heart disease      Thyroid disease      Vitamin D deficiency        Past Surgical  History:    Past Surgical History:   Procedure Laterality Date     CHOLECYSTECTOMY       COLONOSCOPY       COLONOSCOPY N/A 3/10/2021    Procedure: COLONOSCOPY;  Surgeon: Reginaldo Bear MD;  Location:  GI     EXAM UNDER ANESTHESIA RECTUM  4/26/2012    Procedure:EXAM UNDER ANESTHESIA RECTUM; exam under anesthesia, partial sphincterotomy; Surgeon:AMBROCIO ZUNIGA; Location:[facility]     HEAD & NECK SURGERY      Parathyroid tumor     HYSTERECTOMY       LASER YAG CAPSULOTOMY Left 4/15/2015    Procedure: LASER YAG CAPSULOTOMY;  Surgeon: Gary Blackburn MD;  Location:  [facility]     LASER YAG CAPSULOTOMY Right 4/5/2017    Procedure: LASER YAG CAPSULOTOMY;  Surgeon: Gary Blackburn MD;  Location:  EC     PHACOEMULSIFICATION CLEAR CORNEA WITH STANDARD INTRAOCULAR LENS IMPLANT Right 9/3/2014    Procedure: PHACOEMULSIFICATION CLEAR CORNEA WITH STANDARD INTRAOCULAR LENS IMPLANT;  Surgeon: Gary Blackburn MD;  Location:  [facility]     PHACOEMULSIFICATION CLEAR CORNEA WITH STANDARD INTRAOCULAR LENS IMPLANT Left 9/17/2014    Procedure: PHACOEMULSIFICATION CLEAR CORNEA WITH STANDARD INTRAOCULAR LENS IMPLANT;  Surgeon: Gary Blackburn MD;  Location:  [facility]     SPHINCTEROTOMY RECTUM  4/26/2012    Procedure:SPHINCTEROTOMY RECTUM; Surgeon:AMBROCIO ZUNIGA; Location:[facility]        Family History:    family history includes Colon Cancer in her cousin, mother, and paternal half-sister.    Social History:   reports that she has never smoked. She has never used smokeless tobacco. She reports current alcohol use. She reports that she does not use drugs.  PCP: Norah Whittaker     Physical Exam     Patient Vitals for the past 24 hrs:   BP Temp Temp src Pulse Resp SpO2 Weight   11/04/22 1704 (!) 183/74 97.3  F (36.3  C) Temporal 59 18 100 % 85.8 kg (189 lb 2.5 oz)        Physical Exam  Constitutional:       Appearance: She is well-developed.   HENT:      Right Ear: External ear normal.      Left Ear: External ear normal.       Mouth/Throat:      Mouth: Mucous membranes are moist.      Pharynx: Oropharynx is clear. No oropharyngeal exudate.   Eyes:      General: No scleral icterus.     Conjunctiva/sclera: Conjunctivae normal.      Pupils: Pupils are equal, round, and reactive to light.   Cardiovascular:      Rate and Rhythm: Regular rhythm. Bradycardia present.      Heart sounds: Normal heart sounds. No murmur heard.    No friction rub. No gallop.   Pulmonary:      Effort: Pulmonary effort is normal. No respiratory distress.      Breath sounds: Normal breath sounds. No wheezing or rales.   Abdominal:      General: Bowel sounds are normal. There is no distension.      Palpations: Abdomen is soft. There is no mass.      Tenderness: There is no abdominal tenderness.   Musculoskeletal:         General: Normal range of motion.      Right lower leg: No edema.      Left lower leg: No edema.   Skin:     General: Skin is warm and dry.      Capillary Refill: Capillary refill takes less than 2 seconds.      Findings: No rash.   Neurological:      Mental Status: She is alert.           Emergency Department Course   ECG:  NSR 61 bpm, no acute ST changes    Imaging:  XR Chest 2 Views   Final Result   IMPRESSION: No acute cardiopulmonary abnormalities seen to explain patient's chest pain clinically.         Report per radiology    Laboratory:  Labs Ordered and Resulted from Time of ED Arrival to Time of ED Departure   INR - Abnormal       Result Value    INR 1.20 (*)    MAGNESIUM - Abnormal    Magnesium 2.6 (*)    TSH WITH FREE T4 REFLEX - Abnormal    TSH 8.10 (*)    BASIC METABOLIC PANEL - Abnormal    Sodium 136      Potassium 4.6      Chloride 99      Carbon Dioxide (CO2) 24      Anion Gap 13      Urea Nitrogen 36.0 (*)     Creatinine 1.27 (*)     Calcium 8.9      Glucose 109 (*)     GFR Estimate 41 (*)    CBC WITH PLATELETS AND DIFFERENTIAL - Abnormal    WBC Count 17.7 (*)     RBC Count 4.61      Hemoglobin 15.2      Hematocrit 46.5        (*)     MCH 33.0      MCHC 32.7      RDW 12.8      Platelet Count 320      % Neutrophils 79      % Lymphocytes 12      % Monocytes 8      % Eosinophils 0      % Basophils 0      % Immature Granulocytes 1      NRBCs per 100 WBC 0      Absolute Neutrophils 14.0 (*)     Absolute Lymphocytes 2.1      Absolute Monocytes 1.4 (*)     Absolute Eosinophils 0.0      Absolute Basophils 0.0      Absolute Immature Granulocytes 0.2      Absolute NRBCs 0.0     T4 FREE - Abnormal    Free T4 1.74 (*)    TROPONIN T, HIGH SENSITIVITY - Normal    Troponin T, High Sensitivity 13     ROUTINE UA WITH MICROSCOPIC REFLEX TO CULTURE - Normal    Color Urine Straw      Appearance Urine Clear      Glucose Urine Negative      Bilirubin Urine Negative      Ketones Urine Negative      Specific Gravity Urine 1.007      Blood Urine Negative      pH Urine 6.0      Protein Albumin Urine Negative      Urobilinogen Urine Normal      Nitrite Urine Negative      Leukocyte Esterase Urine Negative      RBC Urine 0      WBC Urine 1            Emergency Department Course:       Reviewed:  I reviewed nursing notes, vitals, past medical history and Care Everywhere    Assessments:  1714 I obtained history and examined the patient as noted above.   2000 I rechecked the patient and explained findings     Disposition:  The patient was discharged to home.     Impression & Plan        Medical Decision Making:  Patient presents today for evaluation of 2 episodes of bradycardia.  Patient states that her heart rate normally are in the 50s because she is on amiodarone and metoprolol.  She does not have any symptoms with these episodes.  Currently she is asymptomatic.  She did recently get steroid injections in 4 different places about a week ago.  She wonders if that could cause some issues with her heart rate.  Her evaluation here are within normal range.  She does have elevated white count of 17 but no signs of infection.  Her urine and chest x-ray were clear.  I  reassured her.  I asked her to follow-up with a cardiologist regarding these episodes.  Since they are brief and did not persist and have no symptoms, I did not think at this point we would change any of her medication.  She was monitored here on telemetry without any evidence of heart rate dipping down below 50s.  I did give her return precautions.  If she has persistent bradycardia in the 40s along with symptoms of lightheadedness or dizziness or any other concerns, she needs to come in right away.  She voiced understanding.  She will follow-up next week with her doctor.    Diagnosis:    ICD-10-CM    1. Bradycardia  R00.1                11/4/2022   Adam Alba MD Cheng, [place], MD  11/04/22 2052

## 2022-11-05 NOTE — DISCHARGE INSTRUCTIONS
Please follow up with your cardiology regarding your bradycardia  Return to the ER if dizziness, lightheadedness, chest pain, or persistently low heart rate

## 2022-11-07 LAB
ATRIAL RATE - MUSE: 61 BPM
DIASTOLIC BLOOD PRESSURE - MUSE: NORMAL MMHG
INTERPRETATION ECG - MUSE: NORMAL
P AXIS - MUSE: 78 DEGREES
PR INTERVAL - MUSE: 182 MS
QRS DURATION - MUSE: 88 MS
QT - MUSE: 462 MS
QTC - MUSE: 465 MS
R AXIS - MUSE: 31 DEGREES
SYSTOLIC BLOOD PRESSURE - MUSE: NORMAL MMHG
T AXIS - MUSE: 36 DEGREES
VENTRICULAR RATE- MUSE: 61 BPM

## 2023-03-02 ENCOUNTER — APPOINTMENT (OUTPATIENT)
Dept: ULTRASOUND IMAGING | Facility: CLINIC | Age: 83
End: 2023-03-02
Attending: EMERGENCY MEDICINE
Payer: COMMERCIAL

## 2023-03-02 ENCOUNTER — HOSPITAL ENCOUNTER (EMERGENCY)
Facility: CLINIC | Age: 83
Discharge: HOME | End: 2023-03-02
Attending: EMERGENCY MEDICINE | Admitting: EMERGENCY MEDICINE
Payer: COMMERCIAL

## 2023-03-02 VITALS
SYSTOLIC BLOOD PRESSURE: 167 MMHG | RESPIRATION RATE: 18 BRPM | HEART RATE: 82 BPM | OXYGEN SATURATION: 97 % | TEMPERATURE: 97 F | DIASTOLIC BLOOD PRESSURE: 94 MMHG

## 2023-03-02 DIAGNOSIS — R60.9 EDEMA, UNSPECIFIED TYPE: ICD-10-CM

## 2023-03-02 LAB
ALBUMIN SERPL BCG-MCNC: 4.8 G/DL (ref 3.5–5.2)
ALP SERPL-CCNC: 60 U/L (ref 35–104)
ALT SERPL W P-5'-P-CCNC: 30 U/L (ref 10–35)
ANION GAP SERPL CALCULATED.3IONS-SCNC: 16 MMOL/L (ref 7–15)
AST SERPL W P-5'-P-CCNC: 31 U/L (ref 10–35)
BASOPHILS # BLD AUTO: 0 10E3/UL (ref 0–0.2)
BASOPHILS NFR BLD AUTO: 0 %
BILIRUB DIRECT SERPL-MCNC: <0.2 MG/DL (ref 0–0.3)
BILIRUB SERPL-MCNC: 0.6 MG/DL
BUN SERPL-MCNC: 23.3 MG/DL (ref 8–23)
CALCIUM SERPL-MCNC: 7.9 MG/DL (ref 8.8–10.2)
CHLORIDE SERPL-SCNC: 98 MMOL/L (ref 98–107)
CREAT SERPL-MCNC: 1.28 MG/DL (ref 0.51–0.95)
DEPRECATED HCO3 PLAS-SCNC: 21 MMOL/L (ref 22–29)
EOSINOPHIL # BLD AUTO: 0 10E3/UL (ref 0–0.7)
EOSINOPHIL NFR BLD AUTO: 0 %
ERYTHROCYTE [DISTWIDTH] IN BLOOD BY AUTOMATED COUNT: 14.4 % (ref 10–15)
GFR SERPL CREATININE-BSD FRML MDRD: 41 ML/MIN/1.73M2
GLUCOSE SERPL-MCNC: 195 MG/DL (ref 70–99)
HCT VFR BLD AUTO: 40.3 % (ref 35–47)
HGB BLD-MCNC: 13.5 G/DL (ref 11.7–15.7)
HOLD SPECIMEN: NORMAL
HOLD SPECIMEN: NORMAL
IMM GRANULOCYTES # BLD: 0.1 10E3/UL
IMM GRANULOCYTES NFR BLD: 1 %
LYMPHOCYTES # BLD AUTO: 0.6 10E3/UL (ref 0.8–5.3)
LYMPHOCYTES NFR BLD AUTO: 8 %
MCH RBC QN AUTO: 34.6 PG (ref 26.5–33)
MCHC RBC AUTO-ENTMCNC: 33.5 G/DL (ref 31.5–36.5)
MCV RBC AUTO: 103 FL (ref 78–100)
MONOCYTES # BLD AUTO: 0.1 10E3/UL (ref 0–1.3)
MONOCYTES NFR BLD AUTO: 1 %
NEUTROPHILS # BLD AUTO: 6.8 10E3/UL (ref 1.6–8.3)
NEUTROPHILS NFR BLD AUTO: 90 %
NRBC # BLD AUTO: 0 10E3/UL
NRBC BLD AUTO-RTO: 0 /100
NT-PROBNP SERPL-MCNC: 709 PG/ML (ref 0–1800)
PLATELET # BLD AUTO: 238 10E3/UL (ref 150–450)
POTASSIUM SERPL-SCNC: 4.8 MMOL/L (ref 3.4–5.3)
PROT SERPL-MCNC: 7.4 G/DL (ref 6.4–8.3)
RBC # BLD AUTO: 3.9 10E6/UL (ref 3.8–5.2)
SODIUM SERPL-SCNC: 135 MMOL/L (ref 136–145)
WBC # BLD AUTO: 7.6 10E3/UL (ref 4–11)

## 2023-03-02 PROCEDURE — 99284 EMERGENCY DEPT VISIT MOD MDM: CPT | Mod: 25

## 2023-03-02 PROCEDURE — 82248 BILIRUBIN DIRECT: CPT | Performed by: EMERGENCY MEDICINE

## 2023-03-02 PROCEDURE — 93970 EXTREMITY STUDY: CPT

## 2023-03-02 PROCEDURE — 80053 COMPREHEN METABOLIC PANEL: CPT | Performed by: EMERGENCY MEDICINE

## 2023-03-02 PROCEDURE — 36415 COLL VENOUS BLD VENIPUNCTURE: CPT | Performed by: EMERGENCY MEDICINE

## 2023-03-02 PROCEDURE — 83880 ASSAY OF NATRIURETIC PEPTIDE: CPT | Performed by: EMERGENCY MEDICINE

## 2023-03-02 PROCEDURE — 250N000013 HC RX MED GY IP 250 OP 250 PS 637: Performed by: EMERGENCY MEDICINE

## 2023-03-02 PROCEDURE — 85025 COMPLETE CBC W/AUTO DIFF WBC: CPT | Performed by: EMERGENCY MEDICINE

## 2023-03-02 RX ORDER — FUROSEMIDE 20 MG
20 TABLET ORAL DAILY
Qty: 15 TABLET | Refills: 0 | Status: SHIPPED | OUTPATIENT
Start: 2023-03-02

## 2023-03-02 RX ORDER — FUROSEMIDE 20 MG
20 TABLET ORAL ONCE
Status: COMPLETED | OUTPATIENT
Start: 2023-03-02 | End: 2023-03-02

## 2023-03-02 RX ADMIN — FUROSEMIDE 20 MG: 20 TABLET ORAL at 23:09

## 2023-03-02 ASSESSMENT — ENCOUNTER SYMPTOMS
MYALGIAS: 0
SHORTNESS OF BREATH: 0

## 2023-03-02 ASSESSMENT — ACTIVITIES OF DAILY LIVING (ADL): ADLS_ACUITY_SCORE: 35

## 2023-03-02 NOTE — ED TRIAGE NOTES
"Pt presents to ED with c/o \"a weeping leg\". Pt states that her pants were soaked with fluid yesterday and it has continued into today. Pt states that she has never had this issue before. Pt recently stopped taking her eliquis because she had a an injection in her neck today under sedation. Pt is concerned that she could have a blood clot in her leg due to being off eliquis for 4 days. Pt alert and oriented. Denies pain in her leg.       "

## 2023-03-03 NOTE — ED PROVIDER NOTES
History     Chief Complaint:  Leg swelling     HPI   Brittani Ty is a 82 year old female on Eliquis with a history of HTN, HLD, and AFIB who presents with bilateral leg swelling, especially in the left leg. She reports that her lower left extremity is weeping. She presents to the ED by referral from urgent care. Brittani denies pain in her legs and shortness of breath. She ambulates normally. Not on diuretics.       Independent Historian: Patient    Review of External Notes: Reviewed patient's charting and Care Everywhere     ROS:  Review of Systems   Respiratory: Negative for shortness of breath.    Cardiovascular: Positive for leg swelling (weeping in left leg).   Musculoskeletal: Negative for myalgias (legs).   All other systems reviewed and are negative.      Allergies:  Augmentin   Celecoxib  Lisinopril  Percocet  Primidone  Propranolol  Sulindac  Vioxx     Medications:    Eliquis  Elavil  Lipitor  Rocaltrol  Benadryl  Levothyroxine  Toprol    Past Medical History:    Aortic valve disorder  Arthritis  Dyspepsia  Vertigo  HLD  AFIB  HTN  Hypoparathyroidism  Overweight  Rheumatic heart disease    Past Surgical History:    Cholecystectomy  Colonoscopy x2  Rectal exam under anesthesia   Parathyroid tumor  Hysterectomy  Laser yag capsulotomy x2  Clear cornea phacoemulsification with standard intraocular lens implant  Sphincterotomy rectum    Family History:    Mother: Colon Cancer    Social History:  Patient reports that she has never smoked. She has never used smokeless tobacco. She reports current alcohol use. She reports that she does not use drugs.  Patient arrives by private vehicle alone  PCP: Norah Whittaker     Physical Exam     Patient Vitals for the past 24 hrs:   BP Temp Pulse Resp SpO2   03/02/23 1706 (!) 170/76 97  F (36.1  C) 76 16 98 %        Physical Exam  Vitals reviewed.   HENT:      Right Ear: Tympanic membrane normal.      Left Ear: Tympanic membrane normal.   Eyes:      Pupils: Pupils are  equal, round, and reactive to light.   Cardiovascular:      Rate and Rhythm: Normal rate and regular rhythm.   Pulmonary:      Effort: Pulmonary effort is normal.   Abdominal:      General: Abdomen is flat.      Palpations: Abdomen is soft.   Skin:     General: Skin is warm.      Comments: There is symmetric bilateral edema in both lower ankles and calves.  There is weeping over a pinhole of the left anterior shin.  This is of clear serous fluid   Neurological:      General: No focal deficit present.      Mental Status: She is alert and oriented to person, place, and time.   Psychiatric:         Mood and Affect: Mood normal.         Emergency Department Course     Imaging:  US Lower Extremity Venous Duplex Bilateral   Final Result   IMPRESSION:   1.  No deep venous thrombosis in the bilateral lower extremities.         Report per radiology    Laboratory:  Labs Ordered and Resulted from Time of ED Arrival to Time of ED Departure   BASIC METABOLIC PANEL - Abnormal       Result Value    Sodium 135 (*)     Potassium 4.8      Chloride 98      Carbon Dioxide (CO2) 21 (*)     Anion Gap 16 (*)     Urea Nitrogen 23.3 (*)     Creatinine 1.28 (*)     Calcium 7.9 (*)     Glucose 195 (*)     GFR Estimate 41 (*)    CBC WITH PLATELETS AND DIFFERENTIAL - Abnormal    WBC Count 7.6      RBC Count 3.90      Hemoglobin 13.5      Hematocrit 40.3       (*)     MCH 34.6 (*)     MCHC 33.5      RDW 14.4      Platelet Count 238      % Neutrophils 90      % Lymphocytes 8      % Monocytes 1      % Eosinophils 0      % Basophils 0      % Immature Granulocytes 1      NRBCs per 100 WBC 0      Absolute Neutrophils 6.8      Absolute Lymphocytes 0.6 (*)     Absolute Monocytes 0.1      Absolute Eosinophils 0.0      Absolute Basophils 0.0      Absolute Immature Granulocytes 0.1      Absolute NRBCs 0.0     NT PROBNP INPATIENT - Normal    N terminal Pro BNP Inpatient 709     HEPATIC FUNCTION PANEL        Emergency Department Course &  Assessments:      Interventions/Assessments:  2231 I obtained history and examined the patient as noted above    Independent Interpretation (X-rays, CTs, rhythm strip):  I independently interpreted the patient's imaging in real time       Social Determinants of Health affecting care:   None      Disposition:  The patient was discharged to home.     Impression & Plan    Medical Decision Making:  Patient presents with bilateral lower extremity edema.  Extensive work-up entertained and negative.  Suspected dependent edema.  No concern for blood clot ultrasounds negative.  For diuretics elevation of the legs stockings and follow-up with primary care    Diagnosis:    ICD-10-CM    1. Edema, unspecified type  R60.9            Discharge Medications:  New Prescriptions    FUROSEMIDE (LASIX) 20 MG TABLET    Take 1 tablet (20 mg) by mouth daily          Scribe Disclosure:  I, Zev Hassan, am serving as a scribe at 10:32 PM on 3/2/2023 to document services personally performed by Braden Bull MD based on my observations and the provider's statements to me.   3/2/2023   Braden Bull MD Goodman, Brian Samuel, MD  03/12/23 2041

## 2023-03-03 NOTE — DISCHARGE INSTRUCTIONS
We are recommending initiation of a diuretic due to lower extremity edema.  Please elevate the legs when you can.  Use Ace wraps or elastic stockings to assist in swelling.  Please follow-up with your regular doctor for recheck of your potassium after initiating diuretics as well as reassessment of your edema.  Return to the emergency room with hot red skin shortness of breath or other concerns.  Thanks for your patience this evening.

## 2023-03-03 NOTE — ED PROVIDER NOTES
Rapid Assessment Note    History:   Brittani Ty is a 82 year old female who presents with leg swelling.  Patient with worsened swelling, particularly to left lower extremity for the past few days.  Is also noted weeping from her left lower extremity.  Had a steroid injection to her neck earlier today.  Is on Eliquis for atrial fibrillation, and Eliquis has been held for 4 days.  Patient with concern for DVT.  Referred to urgent care, who subsequent referred patient to ED.  Patient denies pain to her leg.  No prior history of DVT nor PE.  Denies chest pain or shortness of breath.  Denies fevers or chills.  No trauma or falls.    Exam:   General:  Alert, interactive  Cardiovascular:  Well perfused  Lungs:  No respiratory distress, no accessory muscle use  Neuro:  Moving all 4 extremities  Skin:  Warm, dry  Psych:  Normal affect  MSK: 2-3+ pitting LE edema to BLE, L>R, extremities warm, well-perfused with 2+ DP pulse    Plan of Care:   I evaluated the patient and developed an initial plan of care. I discussed this plan and explained that I, or one of my partners, would be returning to complete the evaluation.     3/2/2023  [employer]    Portions of this medical record were completed by a scribe. UPON MY REVIEW AND AUTHENTICATION BY ELECTRONIC SIGNATURE, this confirms (a) I performed the applicable clinical services, and (b) the record is accurate.        Braden Bradley MD  03/02/23 2120

## 2023-04-01 ENCOUNTER — HEALTH MAINTENANCE LETTER (OUTPATIENT)
Age: 83
End: 2023-04-01

## 2023-05-19 ENCOUNTER — HOSPITAL ENCOUNTER (EMERGENCY)
Facility: CLINIC | Age: 83
Discharge: HOME | End: 2023-05-19
Attending: EMERGENCY MEDICINE | Admitting: EMERGENCY MEDICINE
Payer: COMMERCIAL

## 2023-05-19 VITALS
BODY MASS INDEX: 32.76 KG/M2 | TEMPERATURE: 97.5 F | DIASTOLIC BLOOD PRESSURE: 106 MMHG | SYSTOLIC BLOOD PRESSURE: 136 MMHG | WEIGHT: 178 LBS | HEART RATE: 69 BPM | HEIGHT: 62 IN | OXYGEN SATURATION: 98 % | RESPIRATION RATE: 18 BRPM

## 2023-05-19 DIAGNOSIS — R58 ECCHYMOSIS: ICD-10-CM

## 2023-05-19 DIAGNOSIS — N18.9 CHRONIC KIDNEY DISEASE, UNSPECIFIED CKD STAGE: ICD-10-CM

## 2023-05-19 DIAGNOSIS — Z79.01 LONG TERM CURRENT USE OF ANTICOAGULANT THERAPY: ICD-10-CM

## 2023-05-19 LAB
ALBUMIN SERPL BCG-MCNC: 4.3 G/DL (ref 3.5–5.2)
ALP SERPL-CCNC: 61 U/L (ref 35–104)
ALT SERPL W P-5'-P-CCNC: 21 U/L (ref 10–35)
ANION GAP SERPL CALCULATED.3IONS-SCNC: 16 MMOL/L (ref 7–15)
APTT PPP: 40 SECONDS (ref 22–38)
AST SERPL W P-5'-P-CCNC: 24 U/L (ref 10–35)
BASOPHILS # BLD AUTO: 0 10E3/UL (ref 0–0.2)
BASOPHILS NFR BLD AUTO: 0 %
BILIRUB SERPL-MCNC: 0.4 MG/DL
BUN SERPL-MCNC: 25.9 MG/DL (ref 8–23)
CALCIUM SERPL-MCNC: 8.6 MG/DL (ref 8.8–10.2)
CHLORIDE SERPL-SCNC: 98 MMOL/L (ref 98–107)
CREAT SERPL-MCNC: 1.49 MG/DL (ref 0.51–0.95)
DEPRECATED HCO3 PLAS-SCNC: 21 MMOL/L (ref 22–29)
EOSINOPHIL # BLD AUTO: 0 10E3/UL (ref 0–0.7)
EOSINOPHIL NFR BLD AUTO: 0 %
ERYTHROCYTE [DISTWIDTH] IN BLOOD BY AUTOMATED COUNT: 13.7 % (ref 10–15)
GFR SERPL CREATININE-BSD FRML MDRD: 34 ML/MIN/1.73M2
GLUCOSE SERPL-MCNC: 107 MG/DL (ref 70–99)
HCT VFR BLD AUTO: 35.9 % (ref 35–47)
HGB BLD-MCNC: 12 G/DL (ref 11.7–15.7)
IMM GRANULOCYTES # BLD: 0.4 10E3/UL
IMM GRANULOCYTES NFR BLD: 4 %
INR PPP: 1.35 (ref 0.85–1.15)
LYMPHOCYTES # BLD AUTO: 1.6 10E3/UL (ref 0.8–5.3)
LYMPHOCYTES NFR BLD AUTO: 16 %
MCH RBC QN AUTO: 35.9 PG (ref 26.5–33)
MCHC RBC AUTO-ENTMCNC: 33.4 G/DL (ref 31.5–36.5)
MCV RBC AUTO: 108 FL (ref 78–100)
MONOCYTES # BLD AUTO: 1 10E3/UL (ref 0–1.3)
MONOCYTES NFR BLD AUTO: 10 %
NEUTROPHILS # BLD AUTO: 7 10E3/UL (ref 1.6–8.3)
NEUTROPHILS NFR BLD AUTO: 70 %
NRBC # BLD AUTO: 0 10E3/UL
NRBC BLD AUTO-RTO: 0 /100
PLATELET # BLD AUTO: 288 10E3/UL (ref 150–450)
POTASSIUM SERPL-SCNC: 4.8 MMOL/L (ref 3.4–5.3)
PROT SERPL-MCNC: 7 G/DL (ref 6.4–8.3)
RBC # BLD AUTO: 3.34 10E6/UL (ref 3.8–5.2)
SODIUM SERPL-SCNC: 135 MMOL/L (ref 136–145)
WBC # BLD AUTO: 10 10E3/UL (ref 4–11)

## 2023-05-19 PROCEDURE — 85025 COMPLETE CBC W/AUTO DIFF WBC: CPT | Performed by: EMERGENCY MEDICINE

## 2023-05-19 PROCEDURE — 85610 PROTHROMBIN TIME: CPT | Performed by: EMERGENCY MEDICINE

## 2023-05-19 PROCEDURE — 80053 COMPREHEN METABOLIC PANEL: CPT | Performed by: EMERGENCY MEDICINE

## 2023-05-19 PROCEDURE — 85730 THROMBOPLASTIN TIME PARTIAL: CPT | Performed by: EMERGENCY MEDICINE

## 2023-05-19 PROCEDURE — 36415 COLL VENOUS BLD VENIPUNCTURE: CPT | Performed by: EMERGENCY MEDICINE

## 2023-05-19 PROCEDURE — 99283 EMERGENCY DEPT VISIT LOW MDM: CPT

## 2023-05-19 ASSESSMENT — ACTIVITIES OF DAILY LIVING (ADL): ADLS_ACUITY_SCORE: 33

## 2023-05-19 NOTE — ED TRIAGE NOTES
Pt arrives ambulatory with son for bruising that started on right arm, then progressed to left arm and right leg. Started a couple weeks ago and continues to progress. Is on Eliquis for a-fib. Reports no other symptoms other than slightly increased fatigue.

## 2023-05-19 NOTE — DISCHARGE INSTRUCTIONS
Follow-up with your cardiologist in 7 days as arranged.    Return to the ER for any new headache, chest pain, or active bleeding.    If the easy bruising persists you may need to have a dose of your Eliquis decreased.  You will also need further follow-up for evaluation of your kidney function.

## 2023-05-19 NOTE — ED PROVIDER NOTES
History     Chief Complaint:  Rash       HPI   Brittani Ty is an 82 year old female with a history of atrial fibrillation anticoagulated on Eliquis who presents with her son for a neck, left arm, and bilateral leg rash. Patient states that she was taken off Eliquis a couple of weeks ago for a period of 4 days to receive a steroid injection for arthritis and since being back on the medication, has noticed the rash. She says that she hit her hand which seemed to have provoked the rash, but it has spread to her neck, arm, and legs without trauma. Patient states that the rash does not itch or hurt. She denies epistaxis, bloody gums, bloody stool, or hematuria. Patient notes that she was seen in the ED for fluid weeping from her left leg but that has since resolved.       Independent Historian:   Her son is present who provides additional history in terms of her recent medication changes and the fact that she had a steroid injection for her neck.    Review of External Notes:   Primary care note reviewed from March 6, 2023 when the patient was seen in follow-up.  She was having some increased BUN and creatinine and her Lasix was stopped at that point.      Medications:    amitriptyline  Eliquis   atorvastatin   furosemide  levothyroxine   metoprolol succinate    Past Medical History:    Aortic valve disorder  Arthritis   Dyspepsia   Hyperlipidemia  Hypertension  Hypoparathyroidism  Mitral valve disorder   Rheumatic heart disease   Thyroid disease   Vitamin D deficiency       Past Surgical History:    Past Surgical History:   Procedure Laterality Date     CHOLECYSTECTOMY       COLONOSCOPY       COLONOSCOPY N/A 3/10/2021    Procedure: COLONOSCOPY;  Surgeon: Reginaldo Bear MD;  Location:  GI     EXAM UNDER ANESTHESIA RECTUM  4/26/2012    Procedure:EXAM UNDER ANESTHESIA RECTUM; exam under anesthesia, partial sphincterotomy; Surgeon:AMBROCIO ZUNIGA; Location:[facility]     HEAD & NECK SURGERY       "Parathyroid tumor     HYSTERECTOMY       LASER YAG CAPSULOTOMY Left 4/15/2015    Procedure: LASER YAG CAPSULOTOMY;  Surgeon: Gary Blackburn MD;  Location:  [facility]     LASER YAG CAPSULOTOMY Right 4/5/2017    Procedure: LASER YAG CAPSULOTOMY;  Surgeon: Gary Blackburn MD;  Location:  [facility]     PHACOEMULSIFICATION CLEAR CORNEA WITH STANDARD INTRAOCULAR LENS IMPLANT Right 9/3/2014    Procedure: PHACOEMULSIFICATION CLEAR CORNEA WITH STANDARD INTRAOCULAR LENS IMPLANT;  Surgeon: Gary Blackburn MD;  Location:  [facility]     PHACOEMULSIFICATION CLEAR CORNEA WITH STANDARD INTRAOCULAR LENS IMPLANT Left 9/17/2014    Procedure: PHACOEMULSIFICATION CLEAR CORNEA WITH STANDARD INTRAOCULAR LENS IMPLANT;  Surgeon: Gary Blackburn MD;  Location:  [facility]     SPHINCTEROTOMY RECTUM  4/26/2012    Procedure:SPHINCTEROTOMY RECTUM; Surgeon:AMBROCIO ZUNIGA; Location:[facility]        Physical Exam     Patient Vitals for the past 24 hrs:   BP Temp Temp src Pulse Resp SpO2 Height Weight   05/19/23 1605 (!) 178/79 97.5  F (36.4  C) Temporal 71 18 99 % 1.575 m (5' 2\") 80.7 kg (178 lb)        Physical Exam  Constitutional:       General: She is not in acute distress.     Appearance: Normal appearance. She is not diaphoretic.   HENT:      Head: Atraumatic.      Mouth/Throat:      Mouth: Mucous membranes are moist.   Eyes:      General: No scleral icterus.     Conjunctiva/sclera: Conjunctivae normal.   Cardiovascular:      Rate and Rhythm: Normal rate and regular rhythm.      Heart sounds: Normal heart sounds.   Pulmonary:      Effort: No respiratory distress.      Breath sounds: Normal breath sounds.   Abdominal:      General: Abdomen is flat.      Tenderness: There is no abdominal tenderness.   Musculoskeletal:      Cervical back: Neck supple.   Skin:     General: Skin is warm.      Capillary Refill: Capillary refill takes less than 2 seconds.      Findings: No rash.      Comments: Scattered ecchymosis and nonblanching petechia over the " extremities and upper chest.  No active bleeding.   Neurological:      General: No focal deficit present.      Mental Status: She is alert and oriented to person, place, and time.   Psychiatric:         Mood and Affect: Mood normal.         Behavior: Behavior normal.           Emergency Department Course   Laboratory:  Labs Ordered and Resulted from Time of ED Arrival to Time of ED Departure   INR - Abnormal       Result Value    INR 1.35 (*)    PARTIAL THROMBOPLASTIN TIME - Abnormal    aPTT 40 (*)    COMPREHENSIVE METABOLIC PANEL - Abnormal    Sodium 135 (*)     Potassium 4.8      Chloride 98      Carbon Dioxide (CO2) 21 (*)     Anion Gap 16 (*)     Urea Nitrogen 25.9 (*)     Creatinine 1.49 (*)     Calcium 8.6 (*)     Glucose 107 (*)     Alkaline Phosphatase 61      AST 24      ALT 21      Protein Total 7.0      Albumin 4.3      Bilirubin Total 0.4      GFR Estimate 34 (*)    CBC WITH PLATELETS AND DIFFERENTIAL - Abnormal    WBC Count 10.0      RBC Count 3.34 (*)     Hemoglobin 12.0      Hematocrit 35.9       (*)     MCH 35.9 (*)     MCHC 33.4      RDW 13.7      Platelet Count 288      % Neutrophils 70      % Lymphocytes 16      % Monocytes 10      % Eosinophils 0      % Basophils 0      % Immature Granulocytes 4      NRBCs per 100 WBC 0      Absolute Neutrophils 7.0      Absolute Lymphocytes 1.6      Absolute Monocytes 1.0      Absolute Eosinophils 0.0      Absolute Basophils 0.0      Absolute Immature Granulocytes 0.4      Absolute NRBCs 0.0          Emergency Department Course & Assessments:     Assessments:  1822 I rechecked and updated the patient. At this point I feel that the patient is safe for discharge, and the patient agrees.     Disposition:  The patient was discharged to home.     Impression & Plan    Medical Decision Making:  This patient is an 82-year-old woman who presents to the emergency department for evaluation of ecchymosis.  She does use Eliquis for atrial fibrillation.  Platelets look  good.  No sign of infection.    She does not have any active bleeding.  She is chronic headache but no new headache or chest pain.  Hemoglobin does not demonstrate any significant blood loss.    The patient is on a 5 mg dose of Eliquis.  Her kidney function has been gradually increasing over the last couple of years.  She had her Lasix stopped on account of this back in March.  She likely will need further evaluation and this is being followed through the primary care clinic already.    However, the patient is over 78 and now has a creatinine approaching 1.5 so she may need to decrease her dose of Eliquis.  She is actually 1.49 so does not meet heart criteria.  Since she does not have any active bleeding she is appropriate for outpatient follow-up with cardiology.  She has an appointment in 7 days where she will discuss this dosing.      Diagnosis:    ICD-10-CM    1. Ecchymosis  R58       2. Long term current use of anticoagulant therapy  Z79.01       3. Chronic kidney disease, unspecified CKD stage  N18.9            Scribe Disclosure:  I, Jannet Solis, am serving as a scribe at 6:23 PM on 5/19/2023 to document services personally performed by Danis Payton MD based on my observations and the provider's statements to me.     5/19/2023   Danis Payton MD McRoberts, Sean Edward, MD  05/19/23 5634

## 2023-05-19 NOTE — ED NOTES
Rapid Assessment Note    History:   Brittani Ty is an 82 year old female with a history of atrial fibrillation who presents with her son for a neck, left arm, and bilateral leg rash. Patient states that she was taken off Eliquis a couple of weeks ago for a period of 4 days to receive a steroid injection for arthritis and since being back on the medication, has noticed the rash. She says that she hit her hand which seemed to have provoked the rash, but it has spread to her neck, arm, and legs without trauma. Patient states that the rash does not itch or hurt. She denies epistaxis, bloody gums, bloody stool, or hematuria. Patient notes that she was seen in the ED for fluid weeping from her left leg but that has since resolved.       Exam:   General:  Alert, interactive  Cardiovascular:  Well perfused  Lungs:  No respiratory distress, no accessory muscle use  Neuro:  Moving all 4 extremities  Skin:  Warm, dry.  Scattered purpura and nonblanching petechia over the extremities and upper neck.  Extremities: Edema bilaterally of the calf, ankle, and feet.  Psych:  Normal affect      Plan of Care:   I evaluated the patient and developed an initial plan of care. I discussed this plan and explained that I, or one of my partners, would be returning to complete the evaluation.         I, JULI CHANTELLE, am serving as a scribe to document services personally performed by Danis Payton MD, based on my observations and the provider's statements to me.    5/19/2023  [employer]    Portions of this medical record were completed by a scribe. UPON MY REVIEW AND AUTHENTICATION BY ELECTRONIC SIGNATURE, this confirms (a) I performed the applicable clinical services, and (b) the record is accurate.      Danis Payton MD  05/19/23 1700

## 2023-11-13 ENCOUNTER — HOSPITAL ENCOUNTER (OUTPATIENT)
Dept: MAMMOGRAPHY | Facility: CLINIC | Age: 83
Discharge: HOME | End: 2023-11-13
Payer: COMMERCIAL

## 2023-11-13 DIAGNOSIS — Z12.31 VISIT FOR SCREENING MAMMOGRAM: ICD-10-CM

## 2023-11-13 PROCEDURE — 77067 SCR MAMMO BI INCL CAD: CPT

## 2024-03-01 RX ORDER — CLOTRIMAZOLE 1 %
CREAM (GRAM) TOPICAL 2 TIMES DAILY
COMMUNITY
Start: 2023-11-27

## 2024-03-01 RX ORDER — BIOTIN 10000 MCG
1 CAPSULE ORAL DAILY
COMMUNITY

## 2024-03-01 RX ORDER — LEVOTHYROXINE SODIUM 112 UG/1
112 TABLET ORAL DAILY
COMMUNITY
Start: 2023-09-26

## 2024-03-01 RX ORDER — TRIAMCINOLONE ACETONIDE 1 MG/G
CREAM TOPICAL 2 TIMES DAILY
COMMUNITY
Start: 2024-02-20

## 2024-03-01 RX ORDER — AMITRIPTYLINE HYDROCHLORIDE 10 MG/1
10 TABLET ORAL
COMMUNITY
Start: 2023-11-03

## 2024-03-01 RX ORDER — POLYETHYLENE GLYCOL 3350 17 G/17G
1 POWDER, FOR SOLUTION ORAL DAILY
COMMUNITY

## 2024-03-01 RX ORDER — AMLODIPINE BESYLATE 5 MG/1
1 TABLET ORAL DAILY
COMMUNITY
Start: 2024-01-15

## 2024-03-01 RX ORDER — AMIODARONE HYDROCHLORIDE 100 MG/1
100 TABLET ORAL DAILY
COMMUNITY
Start: 2023-06-27

## 2024-03-01 RX ORDER — METOPROLOL SUCCINATE 25 MG/1
12.5 TABLET, EXTENDED RELEASE ORAL DAILY
COMMUNITY
Start: 2023-09-06

## 2024-03-01 RX ORDER — PRAVASTATIN SODIUM 20 MG
20 TABLET ORAL AT BEDTIME
COMMUNITY
Start: 2023-08-25

## 2024-03-05 ENCOUNTER — HOSPITAL ENCOUNTER (EMERGENCY)
Facility: CLINIC | Age: 84
Discharge: HOME | End: 2024-03-05
Attending: EMERGENCY MEDICINE | Admitting: EMERGENCY MEDICINE
Payer: COMMERCIAL

## 2024-03-05 VITALS
HEART RATE: 70 BPM | DIASTOLIC BLOOD PRESSURE: 65 MMHG | TEMPERATURE: 98 F | OXYGEN SATURATION: 99 % | RESPIRATION RATE: 18 BRPM | SYSTOLIC BLOOD PRESSURE: 169 MMHG

## 2024-03-05 DIAGNOSIS — R51.9 NONINTRACTABLE HEADACHE, UNSPECIFIED CHRONICITY PATTERN, UNSPECIFIED HEADACHE TYPE: ICD-10-CM

## 2024-03-05 DIAGNOSIS — R00.2 PALPITATIONS: ICD-10-CM

## 2024-03-05 LAB
ANION GAP SERPL CALCULATED.3IONS-SCNC: 11 MMOL/L (ref 7–15)
ATRIAL RATE - MUSE: 78 BPM
BASOPHILS # BLD AUTO: 0.1 10E3/UL (ref 0–0.2)
BASOPHILS NFR BLD AUTO: 1 %
BUN SERPL-MCNC: 16.5 MG/DL (ref 8–23)
CALCIUM SERPL-MCNC: 8.4 MG/DL (ref 8.8–10.2)
CHLORIDE SERPL-SCNC: 104 MMOL/L (ref 98–107)
CREAT SERPL-MCNC: 0.94 MG/DL (ref 0.51–0.95)
DEPRECATED HCO3 PLAS-SCNC: 26 MMOL/L (ref 22–29)
DIASTOLIC BLOOD PRESSURE - MUSE: NORMAL MMHG
EGFRCR SERPLBLD CKD-EPI 2021: 59 ML/MIN/1.73M2
EOSINOPHIL # BLD AUTO: 0.3 10E3/UL (ref 0–0.7)
EOSINOPHIL NFR BLD AUTO: 3 %
ERYTHROCYTE [DISTWIDTH] IN BLOOD BY AUTOMATED COUNT: 12.1 % (ref 10–15)
GLUCOSE SERPL-MCNC: 108 MG/DL (ref 70–99)
HCT VFR BLD AUTO: 39.8 % (ref 35–47)
HGB BLD-MCNC: 13.7 G/DL (ref 11.7–15.7)
HOLD SPECIMEN: NORMAL
IMM GRANULOCYTES # BLD: 0 10E3/UL
IMM GRANULOCYTES NFR BLD: 0 %
INTERPRETATION ECG - MUSE: NORMAL
LYMPHOCYTES # BLD AUTO: 2.5 10E3/UL (ref 0.8–5.3)
LYMPHOCYTES NFR BLD AUTO: 33 %
MAGNESIUM SERPL-MCNC: 2.2 MG/DL (ref 1.7–2.3)
MCH RBC QN AUTO: 34.4 PG (ref 26.5–33)
MCHC RBC AUTO-ENTMCNC: 34.4 G/DL (ref 31.5–36.5)
MCV RBC AUTO: 100 FL (ref 78–100)
MONOCYTES # BLD AUTO: 0.7 10E3/UL (ref 0–1.3)
MONOCYTES NFR BLD AUTO: 9 %
NEUTROPHILS # BLD AUTO: 4.2 10E3/UL (ref 1.6–8.3)
NEUTROPHILS NFR BLD AUTO: 54 %
NRBC # BLD AUTO: 0 10E3/UL
NRBC BLD AUTO-RTO: 0 /100
NT-PROBNP SERPL-MCNC: 328 PG/ML (ref 0–1800)
P AXIS - MUSE: 80 DEGREES
PLATELET # BLD AUTO: 227 10E3/UL (ref 150–450)
POTASSIUM SERPL-SCNC: 4.4 MMOL/L (ref 3.4–5.3)
PR INTERVAL - MUSE: 176 MS
QRS DURATION - MUSE: 84 MS
QT - MUSE: 412 MS
QTC - MUSE: 469 MS
R AXIS - MUSE: 34 DEGREES
RBC # BLD AUTO: 3.98 10E6/UL (ref 3.8–5.2)
SODIUM SERPL-SCNC: 141 MMOL/L (ref 135–145)
SYSTOLIC BLOOD PRESSURE - MUSE: NORMAL MMHG
T AXIS - MUSE: 36 DEGREES
TROPONIN T SERPL HS-MCNC: 20 NG/L
TROPONIN T SERPL HS-MCNC: 26 NG/L
VENTRICULAR RATE- MUSE: 78 BPM
WBC # BLD AUTO: 7.7 10E3/UL (ref 4–11)

## 2024-03-05 PROCEDURE — 93005 ELECTROCARDIOGRAM TRACING: CPT

## 2024-03-05 PROCEDURE — 83880 ASSAY OF NATRIURETIC PEPTIDE: CPT | Performed by: EMERGENCY MEDICINE

## 2024-03-05 PROCEDURE — 250N000013 HC RX MED GY IP 250 OP 250 PS 637: Performed by: EMERGENCY MEDICINE

## 2024-03-05 PROCEDURE — 84484 ASSAY OF TROPONIN QUANT: CPT | Performed by: EMERGENCY MEDICINE

## 2024-03-05 PROCEDURE — 85025 COMPLETE CBC W/AUTO DIFF WBC: CPT | Performed by: EMERGENCY MEDICINE

## 2024-03-05 PROCEDURE — 36415 COLL VENOUS BLD VENIPUNCTURE: CPT | Performed by: EMERGENCY MEDICINE

## 2024-03-05 PROCEDURE — 83735 ASSAY OF MAGNESIUM: CPT | Performed by: EMERGENCY MEDICINE

## 2024-03-05 PROCEDURE — 80048 BASIC METABOLIC PNL TOTAL CA: CPT | Performed by: EMERGENCY MEDICINE

## 2024-03-05 PROCEDURE — 99284 EMERGENCY DEPT VISIT MOD MDM: CPT

## 2024-03-05 RX ORDER — ACETAMINOPHEN 500 MG
1000 TABLET ORAL ONCE
Status: COMPLETED | OUTPATIENT
Start: 2024-03-05 | End: 2024-03-05

## 2024-03-05 RX ADMIN — ACETAMINOPHEN 1000 MG: 500 TABLET ORAL at 09:51

## 2024-03-05 ASSESSMENT — ACTIVITIES OF DAILY LIVING (ADL)
ADLS_ACUITY_SCORE: 35
ADLS_ACUITY_SCORE: 35
ADLS_ACUITY_SCORE: 33
ADLS_ACUITY_SCORE: 35
ADLS_ACUITY_SCORE: 35

## 2024-03-05 ASSESSMENT — COLUMBIA-SUICIDE SEVERITY RATING SCALE - C-SSRS
1. IN THE PAST MONTH, HAVE YOU WISHED YOU WERE DEAD OR WISHED YOU COULD GO TO SLEEP AND NOT WAKE UP?: NO
2. HAVE YOU ACTUALLY HAD ANY THOUGHTS OF KILLING YOURSELF IN THE PAST MONTH?: NO
6. HAVE YOU EVER DONE ANYTHING, STARTED TO DO ANYTHING, OR PREPARED TO DO ANYTHING TO END YOUR LIFE?: NO

## 2024-03-05 NOTE — DISCHARGE INSTRUCTIONS
Continue your previously prescribed medications    Follow-up with your primary care doctor in 1 to 2 days    Return to the ER for any worsening or concerning symptoms

## 2024-03-05 NOTE — ED TRIAGE NOTES
Pt to ER w c/o afib. Pt states she woke up with a  bad headache, rated 9/10, which resolved. States her pulse was in the 120s. Denies SOB, CP. Hx of afib. Pt takes eliquis and amiodarone ay home. VSS, A&Ox4, ABCs intact.

## 2024-03-05 NOTE — ED PROVIDER NOTES
History     Chief Complaint:  Headache and Tachycardia    The history is provided by the patient.      Brittani Ty is a 83 year old female with a history of hyperlipidemia and hypertension presenting with headache and tachycardia. Brittani felt normal prior to going to sleep last night. Patient woke up this morning with a headache. She looked at her watch and noticed her heart rate was 124 bpm. The male  present at bedside reports Brittani called him around 0500. Brittani is unsure how long the episode lasted. Patient endorses arthritis in her neck, and typically wakes up with head pain in the morning. Denies headache, shortness of breath, or chest pain upon examination. No numbness, tingling, or vision changes attributed to her current symptoms. No nausea, vomiting, or fever. Brittani has a history of atrial fibrillation. She is on Eliquis and Amiodarone with no missed doses. Of note, Brittani reports her dosage of these medications recently decreased. She is followed by Dr. Hodge, and had an echocardiogram done in October 2023. The results of this were normal. Denies recent illness.     Independent Historian:   None - Patient Only    Review of External Notes:   Reviewed cardiology note from December 13, 2023 patient with history of paroxysmal A-fib, mitral valve stenosis.    Medications:    Amiodarone   Amlodipine   Amitriptyline   Eliquis   Furosemide   Metoprolol succinate   Levothyroxine   Miralax  Pravastatin     Past Medical History:    Aortic valve disorder  Afib   Arthritis  CKD  Dyspepsia  Hyperlipidemia  Hypertension  Hearing loss   Hypothyroidism   Hypoparathyroidism  Mitral valve disorder  PONV   Rheumatic heart disease  Thyroid disease  Tremor   Vitamin D deficiency    Past Surgical History:    Cholecystectomy   Colonoscopy   Hysterectomy   Capsulotomy   Phacoemulsification   Sphincterotomy      Physical Exam   Patient Vitals for the past 24 hrs:   BP Temp Temp src Pulse Resp SpO2   03/05/24 1025 (!) 169/65  98  F (36.7  C) Temporal 70 18 99 %   03/05/24 1015 (!) 183/75 -- -- 68 -- 98 %   03/05/24 1000 (!) 195/77 -- -- 66 -- 100 %   03/05/24 0945 (!) 168/71 -- -- 64 -- 99 %   03/05/24 0930 (!) 186/69 -- -- 73 -- 98 %   03/05/24 0915 (!) 178/78 -- -- 67 -- 100 %   03/05/24 0900 (!) 172/82 -- -- 65 -- 98 %   03/05/24 0845 (!) 172/82 -- -- 68 -- 99 %   03/05/24 0830 (!) 171/63 -- -- 64 -- 99 %   03/05/24 0815 (!) 168/66 -- -- 65 -- 99 %   03/05/24 0800 (!) 160/75 -- -- 63 -- 100 %   03/05/24 0745 (!) 170/71 -- -- 65 -- 100 %   03/05/24 0730 (!) 175/68 97  F (36.1  C) Temporal 66 16 99 %   03/05/24 0715 (!) 169/65 -- -- 66 -- 98 %   03/05/24 0700 (!) 166/80 -- -- 62 -- 100 %   03/05/24 0648 -- -- -- 65 -- 99 %   03/05/24 0645 -- -- -- 67 -- 97 %   03/05/24 0644 (!) 172/67 -- -- 67 -- --   03/05/24 0600 (!) 148/80 97  F (36.1  C) Temporal 82 18 100 %      Physical Exam  Vitals reviewed.   Constitutional:       General: She is not in acute distress.     Appearance: She is not ill-appearing.   HENT:      Head: Normocephalic and atraumatic.   Eyes:      Extraocular Movements: Extraocular movements intact.   Cardiovascular:      Rate and Rhythm: Normal rate and regular rhythm.   Pulmonary:      Effort: Pulmonary effort is normal. No respiratory distress.      Breath sounds: Normal breath sounds. No wheezing.   Abdominal:      Palpations: Abdomen is soft.      Tenderness: There is no abdominal tenderness. There is no guarding.   Musculoskeletal:      Cervical back: Normal range of motion.   Skin:     General: Skin is warm and dry.   Neurological:      Mental Status: She is alert and oriented to person, place, and time.      GCS: GCS eye subscore is 4. GCS verbal subscore is 5. GCS motor subscore is 6.   Psychiatric:         Behavior: Behavior normal.           Emergency Department Course   ECG  ECG results from 03/05/24   EKG 12 lead     Value    Systolic Blood Pressure     Diastolic Blood Pressure     Ventricular Rate 78     Atrial Rate 78    MN Interval 176    QRS Duration 84        QTc 469    P Axis 80    R AXIS 34    T Axis 36    Interpretation ECG      Sinus rhythm  Pulmonary disease pattern  Septal infarct (cited on or before 04-NOV-2022)  Abnormal ECG  When compared with ECG of 04-NOV-2022 17:20,  Questionable change in initial forces of Anterior leads  Confirmed by - EMERGENCY ROOM, PHYSICIAN ([id number]),  CELESTE LEWIS ([id number]) on 3/5/2024 6:50:11 AM  EKG interpreted by me at 0610     Imaging:  No orders to display      Laboratory:  Labs Ordered and Resulted from Time of ED Arrival to Time of ED Departure   BASIC METABOLIC PANEL - Abnormal       Result Value    Sodium 141      Potassium 4.4      Chloride 104      Carbon Dioxide (CO2) 26      Anion Gap 11      Urea Nitrogen 16.5      Creatinine 0.94      GFR Estimate 59 (*)     Calcium 8.4 (*)     Glucose 108 (*)    TROPONIN T, HIGH SENSITIVITY - Abnormal    Troponin T, High Sensitivity 26 (*)    CBC WITH PLATELETS AND DIFFERENTIAL - Abnormal    WBC Count 7.7      RBC Count 3.98      Hemoglobin 13.7      Hematocrit 39.8            MCH 34.4 (*)     MCHC 34.4      RDW 12.1      Platelet Count 227      % Neutrophils 54      % Lymphocytes 33      % Monocytes 9      % Eosinophils 3      % Basophils 1      % Immature Granulocytes 0      NRBCs per 100 WBC 0      Absolute Neutrophils 4.2      Absolute Lymphocytes 2.5      Absolute Monocytes 0.7      Absolute Eosinophils 0.3      Absolute Basophils 0.1      Absolute Immature Granulocytes 0.0      Absolute NRBCs 0.0     TROPONIN T, HIGH SENSITIVITY - Abnormal    Troponin T, High Sensitivity 20 (*)    MAGNESIUM - Normal    Magnesium 2.2     NT PROBNP INPATIENT - Normal    N terminal Pro BNP Inpatient 328       Emergency Department Course & Assessments:    Interventions:  Medications   acetaminophen (TYLENOL) tablet 1,000 mg (1,000 mg Oral $Given 3/5/24 3170)      Independent Interpretation (X-rays, CTs, rhythm strip):  No  imaging completed     Assessments/Consultations/Discussion of Management or Tests:  ED Course as of 24 1100   Tue Mar 05, 2024   0629 I obtained the history and examined the patient as noted above.      0835 I rechecked and updated the patient.      0844 Updated pt on plan for repeat trop at 10AM   0844 Pt continues to be asymptomatic   1023 I rechecked and updated the patient.        Social Determinants of Health affecting care:   None    Disposition:  The patient was discharged to home.     Impression & Plan      Medical Decision Makin-year-old female history of paroxysmal A-fib presenting today with headache and palpitations.  She states that she checked her watch this morning and noted that her heart rate was in the 120s.  States that symptoms resolved shortly after.  She is currently in normal sinus rhythm.  She reports no complaints at this time.  Lab work ordered troponin slightly elevated on repeat it trended down.  She continued to be asymptomatic all throughout her ER stay.  Discussed plan for close follow-up with her primary care doctor and cardiologist.  Discussed with her care instructions and strict return precautions.  She verbalized understanding and agreement. All questions and concerns addressed at this time.     Diagnosis:    ICD-10-CM    1. Nonintractable headache, unspecified chronicity pattern, unspecified headache type  R51.9       2. Palpitations  R00.2          Discharge Medications:  Discharge Medication List as of 3/5/2024 10:26 AM         Scribe Disclosure:  Norah LOMBARDI, am serving as a scribe at 6:04 AM on 3/5/2024 to document services personally performed by Jaymie Roblero DO based on my observations and the provider's statements to me.  3/5/2024   Jaymie Roblero DO Doan, Tiffani, DO  24 1102

## 2024-03-11 ENCOUNTER — ANESTHESIA (OUTPATIENT)
Dept: SURGERY | Facility: CLINIC | Age: 84
End: 2024-03-11
Payer: COMMERCIAL

## 2024-03-11 ENCOUNTER — HOSPITAL ENCOUNTER (OUTPATIENT)
Facility: CLINIC | Age: 84
Discharge: HOME | End: 2024-03-11
Attending: INTERNAL MEDICINE | Admitting: INTERNAL MEDICINE
Payer: COMMERCIAL

## 2024-03-11 ENCOUNTER — ANESTHESIA EVENT (OUTPATIENT)
Dept: SURGERY | Facility: CLINIC | Age: 84
End: 2024-03-11
Payer: COMMERCIAL

## 2024-03-11 VITALS
WEIGHT: 166.1 LBS | BODY MASS INDEX: 30.57 KG/M2 | TEMPERATURE: 97.3 F | OXYGEN SATURATION: 99 % | SYSTOLIC BLOOD PRESSURE: 147 MMHG | HEART RATE: 83 BPM | RESPIRATION RATE: 18 BRPM | DIASTOLIC BLOOD PRESSURE: 70 MMHG | HEIGHT: 62 IN

## 2024-03-11 LAB — UPPER GI ENDOSCOPY: NORMAL

## 2024-03-11 PROCEDURE — C1726 CATH, BAL DIL, NON-VASCULAR: HCPCS | Performed by: INTERNAL MEDICINE

## 2024-03-11 PROCEDURE — 710N000012 HC RECOVERY PHASE 2, PER MINUTE: Performed by: INTERNAL MEDICINE

## 2024-03-11 PROCEDURE — 250N000011 HC RX IP 250 OP 636: Performed by: NURSE ANESTHETIST, CERTIFIED REGISTERED

## 2024-03-11 PROCEDURE — 999N000141 HC STATISTIC PRE-PROCEDURE NURSING ASSESSMENT: Performed by: INTERNAL MEDICINE

## 2024-03-11 PROCEDURE — 272N000001 HC OR GENERAL SUPPLY STERILE: Performed by: INTERNAL MEDICINE

## 2024-03-11 PROCEDURE — 370N000017 HC ANESTHESIA TECHNICAL FEE, PER MIN: Performed by: INTERNAL MEDICINE

## 2024-03-11 PROCEDURE — 258N000003 HC RX IP 258 OP 636: Performed by: NURSE ANESTHETIST, CERTIFIED REGISTERED

## 2024-03-11 PROCEDURE — 88305 TISSUE EXAM BY PATHOLOGIST: CPT | Mod: TC | Performed by: INTERNAL MEDICINE

## 2024-03-11 PROCEDURE — 360N000075 HC SURGERY LEVEL 2, PER MIN: Performed by: INTERNAL MEDICINE

## 2024-03-11 PROCEDURE — 250N000009 HC RX 250: Performed by: NURSE ANESTHETIST, CERTIFIED REGISTERED

## 2024-03-11 RX ORDER — SODIUM CHLORIDE, SODIUM LACTATE, POTASSIUM CHLORIDE, CALCIUM CHLORIDE 600; 310; 30; 20 MG/100ML; MG/100ML; MG/100ML; MG/100ML
INJECTION, SOLUTION INTRAVENOUS CONTINUOUS
Status: DISCONTINUED | OUTPATIENT
Start: 2024-03-11 | End: 2024-03-11 | Stop reason: HOSPADM

## 2024-03-11 RX ORDER — PROCHLORPERAZINE MALEATE 5 MG
5 TABLET ORAL EVERY 6 HOURS PRN
Status: CANCELLED | OUTPATIENT
Start: 2024-03-11

## 2024-03-11 RX ORDER — ONDANSETRON 4 MG/1
4 TABLET, ORALLY DISINTEGRATING ORAL EVERY 30 MIN PRN
Status: CANCELLED | OUTPATIENT
Start: 2024-03-11

## 2024-03-11 RX ORDER — PROPOFOL 10 MG/ML
INJECTION, EMULSION INTRAVENOUS CONTINUOUS PRN
Status: DISCONTINUED | OUTPATIENT
Start: 2024-03-11 | End: 2024-03-11

## 2024-03-11 RX ORDER — FENTANYL CITRATE 50 UG/ML
25 INJECTION, SOLUTION INTRAMUSCULAR; INTRAVENOUS EVERY 5 MIN PRN
Status: CANCELLED | OUTPATIENT
Start: 2024-03-11

## 2024-03-11 RX ORDER — ONDANSETRON 4 MG/1
4 TABLET, ORALLY DISINTEGRATING ORAL EVERY 6 HOURS PRN
Status: CANCELLED | OUTPATIENT
Start: 2024-03-11

## 2024-03-11 RX ORDER — HYDROMORPHONE HCL IN WATER/PF 6 MG/30 ML
0.4 PATIENT CONTROLLED ANALGESIA SYRINGE INTRAVENOUS EVERY 5 MIN PRN
Status: CANCELLED | OUTPATIENT
Start: 2024-03-11

## 2024-03-11 RX ORDER — ONDANSETRON 2 MG/ML
4 INJECTION INTRAMUSCULAR; INTRAVENOUS
Status: DISCONTINUED | OUTPATIENT
Start: 2024-03-11 | End: 2024-03-11 | Stop reason: HOSPADM

## 2024-03-11 RX ORDER — ONDANSETRON 2 MG/ML
INJECTION INTRAMUSCULAR; INTRAVENOUS PRN
Status: DISCONTINUED | OUTPATIENT
Start: 2024-03-11 | End: 2024-03-11

## 2024-03-11 RX ORDER — NALOXONE HYDROCHLORIDE 0.4 MG/ML
0.2 INJECTION, SOLUTION INTRAMUSCULAR; INTRAVENOUS; SUBCUTANEOUS
Status: CANCELLED | OUTPATIENT
Start: 2024-03-11

## 2024-03-11 RX ORDER — NALOXONE HYDROCHLORIDE 0.4 MG/ML
0.4 INJECTION, SOLUTION INTRAMUSCULAR; INTRAVENOUS; SUBCUTANEOUS
Status: CANCELLED | OUTPATIENT
Start: 2024-03-11

## 2024-03-11 RX ORDER — ONDANSETRON 2 MG/ML
4 INJECTION INTRAMUSCULAR; INTRAVENOUS EVERY 6 HOURS PRN
Status: CANCELLED | OUTPATIENT
Start: 2024-03-11

## 2024-03-11 RX ORDER — SODIUM CHLORIDE, SODIUM LACTATE, POTASSIUM CHLORIDE, CALCIUM CHLORIDE 600; 310; 30; 20 MG/100ML; MG/100ML; MG/100ML; MG/100ML
INJECTION, SOLUTION INTRAVENOUS CONTINUOUS PRN
Status: DISCONTINUED | OUTPATIENT
Start: 2024-03-11 | End: 2024-03-11

## 2024-03-11 RX ORDER — ONDANSETRON 2 MG/ML
4 INJECTION INTRAMUSCULAR; INTRAVENOUS EVERY 30 MIN PRN
Status: CANCELLED | OUTPATIENT
Start: 2024-03-11

## 2024-03-11 RX ORDER — FENTANYL CITRATE 50 UG/ML
50 INJECTION, SOLUTION INTRAMUSCULAR; INTRAVENOUS EVERY 5 MIN PRN
Status: CANCELLED | OUTPATIENT
Start: 2024-03-11

## 2024-03-11 RX ORDER — GLYCOPYRROLATE 0.2 MG/ML
INJECTION, SOLUTION INTRAMUSCULAR; INTRAVENOUS PRN
Status: DISCONTINUED | OUTPATIENT
Start: 2024-03-11 | End: 2024-03-11

## 2024-03-11 RX ORDER — NALOXONE HYDROCHLORIDE 0.4 MG/ML
0.1 INJECTION, SOLUTION INTRAMUSCULAR; INTRAVENOUS; SUBCUTANEOUS
Status: CANCELLED | OUTPATIENT
Start: 2024-03-11

## 2024-03-11 RX ORDER — FLUMAZENIL 0.1 MG/ML
0.2 INJECTION, SOLUTION INTRAVENOUS
Status: CANCELLED | OUTPATIENT
Start: 2024-03-11 | End: 2024-03-11

## 2024-03-11 RX ORDER — LIDOCAINE 40 MG/G
CREAM TOPICAL
Status: DISCONTINUED | OUTPATIENT
Start: 2024-03-11 | End: 2024-03-11 | Stop reason: HOSPADM

## 2024-03-11 RX ORDER — SODIUM CHLORIDE, SODIUM LACTATE, POTASSIUM CHLORIDE, CALCIUM CHLORIDE 600; 310; 30; 20 MG/100ML; MG/100ML; MG/100ML; MG/100ML
INJECTION, SOLUTION INTRAVENOUS CONTINUOUS
Status: CANCELLED | OUTPATIENT
Start: 2024-03-11

## 2024-03-11 RX ORDER — HYDROMORPHONE HCL IN WATER/PF 6 MG/30 ML
0.2 PATIENT CONTROLLED ANALGESIA SYRINGE INTRAVENOUS EVERY 5 MIN PRN
Status: CANCELLED | OUTPATIENT
Start: 2024-03-11

## 2024-03-11 RX ADMIN — PROPOFOL 125 MCG/KG/MIN: 10 INJECTION, EMULSION INTRAVENOUS at 09:22

## 2024-03-11 RX ADMIN — GLYCOPYRROLATE 0.2 MG: 0.2 INJECTION, SOLUTION INTRAMUSCULAR; INTRAVENOUS at 09:19

## 2024-03-11 RX ADMIN — ONDANSETRON 4 MG: 2 INJECTION INTRAMUSCULAR; INTRAVENOUS at 09:25

## 2024-03-11 RX ADMIN — SODIUM CHLORIDE, POTASSIUM CHLORIDE, SODIUM LACTATE AND CALCIUM CHLORIDE: 600; 310; 30; 20 INJECTION, SOLUTION INTRAVENOUS at 09:17

## 2024-03-11 RX ADMIN — TOPICAL ANESTHETIC 1 EACH: 200 SPRAY DENTAL; PERIODONTAL at 09:22

## 2024-03-11 RX ADMIN — MIDAZOLAM 1 MG: 1 INJECTION INTRAMUSCULAR; INTRAVENOUS at 09:19

## 2024-03-11 ASSESSMENT — ACTIVITIES OF DAILY LIVING (ADL)
ADLS_ACUITY_SCORE: 36

## 2024-03-11 ASSESSMENT — ENCOUNTER SYMPTOMS: DYSRHYTHMIAS: 1

## 2024-03-11 NOTE — ANESTHESIA POSTPROCEDURE EVALUATION
Patient: Brittani Ty    Procedure: Procedure(s):  ESOPHAGOGASTRODUODENOSCOPY       Anesthesia Type:  General    Note:     Postop Pain Control: Uneventful            Sign Out: Well controlled pain   PONV: No   Neuro/Psych: Uneventful            Sign Out: Acceptable/Baseline neuro status   Airway/Respiratory:             Sign Out: Acceptable/Baseline resp. status   CV/Hemodynamics:             Sign Out: Acceptable CV status   Other NRE:    DID A NON-ROUTINE EVENT OCCUR?            Last vitals:  Vitals Value Taken Time   /47 03/11/24 0945   Temp 97.8  F (36.6  C) 03/11/24 0945   Pulse     Resp 18 03/11/24 0945   SpO2 97 % 03/11/24 0949   Vitals shown include unfiled device data.    Electronically Signed By: Josue Barboza DO  March 11, 2024  9:51 AM

## 2024-03-11 NOTE — OR NURSING
Per Dr. Doss, Brittani to resume eliquis tomorrow 3/12/24. Pt and son in agreement with this plan.

## 2024-03-11 NOTE — ANESTHESIA PREPROCEDURE EVALUATION
Anesthesia Pre-Procedure Evaluation    Patient: Brittani Ty   MRN: [id number] : 1940        Procedure : Procedure(s):  ESOPHAGOGASTRODUODENOSCOPY          Past Medical History:   Diagnosis Date    Aortic valve disorder     Arthritis     Chest pain, unspecified     ECHO 2011-NO ISCHEMIA    Dyspepsia     Hyperlipidemia     Hypertension     Hypoparathyroidism (H24)     Mitral valve disorder     Overweight(278.02)     PONV (postoperative nausea and vomiting)     Rheumatic heart disease     Thyroid disease     HYPOTHYROID    Vitamin D deficiency       Past Surgical History:   Procedure Laterality Date    CHOLECYSTECTOMY      COLONOSCOPY      COLONOSCOPY N/A 3/10/2021    Procedure: COLONOSCOPY;  Surgeon: Reginaldo Bear MD;  Location:  GI    EXAM UNDER ANESTHESIA RECTUM  2012    Procedure:EXAM UNDER ANESTHESIA RECTUM; exam under anesthesia, partial sphincterotomy; Surgeon:AMBROCIO ZUNIGA; Location:[facility]    HEAD & NECK SURGERY      Parathyroid tumor    HYSTERECTOMY      LASER YAG CAPSULOTOMY Left 4/15/2015    Procedure: LASER YAG CAPSULOTOMY;  Surgeon: Gary Blackburn MD;  Location:  EC    LASER YAG CAPSULOTOMY Right 2017    Procedure: LASER YAG CAPSULOTOMY;  Surgeon: Gary Blackburn MD;  Location:  EC    PHACOEMULSIFICATION CLEAR CORNEA WITH STANDARD INTRAOCULAR LENS IMPLANT Right 9/3/2014    Procedure: PHACOEMULSIFICATION CLEAR CORNEA WITH STANDARD INTRAOCULAR LENS IMPLANT;  Surgeon: Gary Blackburn MD;  Location:  [facility]    PHACOEMULSIFICATION CLEAR CORNEA WITH STANDARD INTRAOCULAR LENS IMPLANT Left 2014    Procedure: PHACOEMULSIFICATION CLEAR CORNEA WITH STANDARD INTRAOCULAR LENS IMPLANT;  Surgeon: Gary Blackburn MD;  Location:  [facility]    SPHINCTEROTOMY RECTUM  2012    Procedure:SPHINCTEROTOMY RECTUM; Surgeon:AMBROCIO ZUNIGA; Location:[facility]      Allergies   Allergen Reactions    Augmentin [Amoxicillin-Pot Clavulanate]     Celecoxib     Lisinopril Cough     Percocet [Oxycodone-Acetaminophen]     Primidone      Other reaction(s): Sedation    Propranolol      Bradycardia into 40's     Sulindac      Other reaction(s): Vomiting    Vioxx       Social History     Tobacco Use    Smoking status: Never    Smokeless tobacco: Never   Substance Use Topics    Alcohol use: Yes     Comment: rare      Wt Readings from Last 1 Encounters:   03/11/24 75.3 kg (166 lb 1.6 oz)        Anesthesia Evaluation            ROS/MED HX  ENT/Pulmonary:  - neg pulmonary ROS     Neurologic:  - neg neurologic ROS     Cardiovascular: Comment: Rheumatic heart disease    (+)  hypertension- -  CAD -  - -                        dysrhythmias, a-fib,             METS/Exercise Tolerance: >4 METS    Hematologic:  - neg hematologic  ROS     Musculoskeletal:  - neg musculoskeletal ROS     GI/Hepatic: Comment: dysphagia      Renal/Genitourinary:     (+) renal disease, type: CRI,            Endo: Comment: Parathyroid.....    .Body mass index is 30.37 kg/m .      (+)          thyroid problem,            Psychiatric/Substance Use:  - neg psychiatric ROS     Infectious Disease:  - neg infectious disease ROS     Malignancy:  - neg malignancy ROS     Other:  - neg other ROS          Physical Exam    Airway        Mallampati: II    Neck ROM: limited     Respiratory Devices and Support         Dental           Cardiovascular   cardiovascular exam normal       Rhythm and rate: regular     Pulmonary   pulmonary exam normal                OUTSIDE LABS:  CBC:   Lab Results   Component Value Date    WBC 7.7 03/05/2024    WBC 10.0 05/19/2023    HGB 13.7 03/05/2024    HGB 12.0 05/19/2023    HCT 39.8 03/05/2024    HCT 35.9 05/19/2023     03/05/2024     05/19/2023     BMP:   Lab Results   Component Value Date     03/05/2024     (L) 05/19/2023    POTASSIUM 4.4 03/05/2024    POTASSIUM 4.8 05/19/2023    CHLORIDE 104 03/05/2024    CHLORIDE 98 05/19/2023    CO2 26 03/05/2024    CO2 21 (L) 05/19/2023    BUN  "16.5 03/05/2024    BUN 25.9 (H) 05/19/2023    CR 0.94 03/05/2024    CR 1.49 (H) 05/19/2023     (H) 03/05/2024     (H) 05/19/2023     COAGS:   Lab Results   Component Value Date    PTT 40 (H) 05/19/2023    INR 1.35 (H) 05/19/2023     POC: No results found for: \"BGM\", \"HCG\", \"HCGS\"  HEPATIC:   Lab Results   Component Value Date    ALBUMIN 4.3 05/19/2023    PROTTOTAL 7.0 05/19/2023    ALT 21 05/19/2023    AST 24 05/19/2023    ALKPHOS 61 05/19/2023    BILITOTAL 0.4 05/19/2023     OTHER:   Lab Results   Component Value Date    JAIME 8.4 (L) 03/05/2024    MAG 2.2 03/05/2024    TSH 8.10 (H) 11/04/2022    T4 1.74 (H) 11/04/2022       Anesthesia Plan    ASA Status:  3       Anesthesia Type: MAC.              Consents    Anesthesia Plan(s) and associated risks, benefits, and realistic alternatives discussed. Questions answered and patient/representative(s) expressed understanding.     - Discussed:     - Discussed with:  Patient            Postoperative Care    Pain management: IV analgesics, Oral pain medications, Multi-modal analgesia.   PONV prophylaxis: Ondansetron (or other 5HT-3)     Comments:    Other Comments: Glyco IV, hurricaine spray, propofol           Josue Barboza, DO    I have reviewed the pertinent notes and labs in the chart from the past 30 days and (re)examined the patient.  Any updates or changes from those notes are reflected in this note.            # Drug Induced Coagulation Defect: home medication list includes an anticoagulant medication   # Obesity: Estimated body mass index is 30.37 kg/m  as calculated from the following:    Height as of this encounter: 1.575 m (5' 2.01\").    Weight as of this encounter: 75.3 kg (166 lb 1.6 oz).      "

## 2024-03-11 NOTE — PROCEDURES
PRE-PROCEDURE H&P    CHIEF COMPLAINT / REASON FOR PROCEDURE:  dysphagia    PERTINENT HISTORY :    Past Medical History:   Diagnosis Date    Aortic valve disorder     Arthritis     Chest pain, unspecified     ECHO 12/2011-NO ISCHEMIA    Dyspepsia     Hyperlipidemia     Hypertension     Hypoparathyroidism (H24)     Mitral valve disorder     Overweight(278.02)     PONV (postoperative nausea and vomiting)     Rheumatic heart disease     Thyroid disease     HYPOTHYROID    Vitamin D deficiency       Past Surgical History:   Procedure Laterality Date    CHOLECYSTECTOMY      COLONOSCOPY      COLONOSCOPY N/A 3/10/2021    Procedure: COLONOSCOPY;  Surgeon: Reginaldo Bear MD;  Location:  GI    EXAM UNDER ANESTHESIA RECTUM  4/26/2012    Procedure:EXAM UNDER ANESTHESIA RECTUM; exam under anesthesia, partial sphincterotomy; Surgeon:AMBROCIO ZUNIGA; Location:[facility]    HEAD & NECK SURGERY      Parathyroid tumor    HYSTERECTOMY      LASER YAG CAPSULOTOMY Left 4/15/2015    Procedure: LASER YAG CAPSULOTOMY;  Surgeon: Gary Blackburn MD;  Location:  [facility]    LASER YAG CAPSULOTOMY Right 4/5/2017    Procedure: LASER YAG CAPSULOTOMY;  Surgeon: Gary Blackburn MD;  Location:  [facility]    PHACOEMULSIFICATION CLEAR CORNEA WITH STANDARD INTRAOCULAR LENS IMPLANT Right 9/3/2014    Procedure: PHACOEMULSIFICATION CLEAR CORNEA WITH STANDARD INTRAOCULAR LENS IMPLANT;  Surgeon: Gary Blackburn MD;  Location:  [facility]    PHACOEMULSIFICATION CLEAR CORNEA WITH STANDARD INTRAOCULAR LENS IMPLANT Left 9/17/2014    Procedure: PHACOEMULSIFICATION CLEAR CORNEA WITH STANDARD INTRAOCULAR LENS IMPLANT;  Surgeon: Gary Blackburn MD;  Location:  [facility]    SPHINCTEROTOMY RECTUM  4/26/2012    Procedure:SPHINCTEROTOMY RECTUM; Surgeon:AMBROCIO ZUNIGA; Location:[facility]         Bleeding tendencies:  No    Relevant Family History:  NONE     Relevant Social History:  NONE      A relevant review of systems was performed and was  negative        ALLERGIES/SENSITIVITIES:   Allergies   Allergen Reactions    Augmentin [Amoxicillin-Pot Clavulanate]     Celecoxib     Lisinopril Cough    Percocet [Oxycodone-Acetaminophen]     Primidone      Other reaction(s): Sedation    Propranolol      Bradycardia into 40's     Sulindac      Other reaction(s): Vomiting    Vioxx        CURRENT MEDICATIONS:   No current outpatient medications on file.        PRE-SEDATION ASSESSMENT:    Lung Exam:  normal  Heart Exam:  normal  Airway Exam: normal  Previous reaction to anesthesia/sedation:   No  Sedation plan based on assessment: mac  ASA Classification:  3 - Severe systemic disease, but not incapacitating        IMPRESSION:  dysphagia    PLAN:  egd     Kateryna Doss MD  [facility]  Office: [phone]

## 2024-03-11 NOTE — ANESTHESIA CARE TRANSFER NOTE
Patient: Brittani Ty    Procedure: Procedure(s):  ESOPHAGOGASTRODUODENOSCOPY       Diagnosis: Dysphagia [R13.10]  Swallowing problem [R13.10]  Diagnosis Additional Information: No value filed.    Anesthesia Type:   General     Note:    Oropharynx: oropharynx clear of all foreign objects and spontaneously breathing  Level of Consciousness: awake  Oxygen Supplementation: room air    Independent Airway: airway patency satisfactory and stable  Dentition: dentition unchanged  Vital Signs Stable: post-procedure vital signs reviewed and stable  Report to RN Given: handoff report given  Patient transferred to: Phase II    Handoff Report: Identifed the Patient, Identified the Reponsible Provider, Reviewed the pertinent medical history, Discussed the surgical course, Reviewed Intra-OP anesthesia mangement and issues during anesthesia, Set expectations for post-procedure period and Allowed opportunity for questions and acknowledgement of understanding      Vitals:  Vitals Value Taken Time   /47 03/11/24 0945   Temp 97.8  F (36.6  C) 03/11/24 0945   Pulse     Resp 18 03/11/24 0945   SpO2 97 % 03/11/24 0945       Electronically Signed By: TAMAR Davila CRNA  March 11, 2024  9:45 AM

## 2024-03-12 LAB
PATH REPORT.COMMENTS IMP SPEC: NORMAL
PATH REPORT.COMMENTS IMP SPEC: NORMAL
PATH REPORT.FINAL DX SPEC: NORMAL
PATH REPORT.GROSS SPEC: NORMAL
PATH REPORT.MICROSCOPIC SPEC OTHER STN: NORMAL
PATH REPORT.RELEVANT HX SPEC: NORMAL
PHOTO IMAGE: NORMAL

## 2024-03-12 PROCEDURE — 88305 TISSUE EXAM BY PATHOLOGIST: CPT | Mod: 26 | Performed by: PATHOLOGY

## 2024-03-19 ENCOUNTER — DOCUMENTATION ONLY (OUTPATIENT)
Dept: OTHER | Facility: CLINIC | Age: 84
End: 2024-03-19
Payer: COMMERCIAL

## 2024-11-27 ENCOUNTER — HOSPITAL ENCOUNTER (OUTPATIENT)
Dept: MAMMOGRAPHY | Facility: CLINIC | Age: 84
Discharge: HOME | End: 2024-11-27
Attending: FAMILY MEDICINE
Payer: COMMERCIAL

## 2024-11-27 ENCOUNTER — DOCUMENTATION ONLY (OUTPATIENT)
Dept: OTHER | Facility: CLINIC | Age: 84
End: 2024-11-27
Payer: COMMERCIAL

## 2024-11-27 DIAGNOSIS — Z12.31 VISIT FOR SCREENING MAMMOGRAM: ICD-10-CM

## 2024-11-27 PROCEDURE — 77067 SCR MAMMO BI INCL CAD: CPT

## 2024-11-27 PROCEDURE — 77063 BREAST TOMOSYNTHESIS BI: CPT

## 2025-02-23 ENCOUNTER — HEALTH MAINTENANCE LETTER (OUTPATIENT)
Age: 85
End: 2025-02-23

## 2025-03-10 ENCOUNTER — HOSPITAL ENCOUNTER (EMERGENCY)
Facility: CLINIC | Age: 85
Discharge: HOME | End: 2025-03-10
Attending: SOCIAL WORKER | Admitting: SOCIAL WORKER
Payer: COMMERCIAL

## 2025-03-10 ENCOUNTER — APPOINTMENT (OUTPATIENT)
Dept: CT IMAGING | Facility: CLINIC | Age: 85
End: 2025-03-10
Attending: SOCIAL WORKER
Payer: COMMERCIAL

## 2025-03-10 VITALS
TEMPERATURE: 97.6 F | HEART RATE: 76 BPM | WEIGHT: 169.53 LBS | BODY MASS INDEX: 32.01 KG/M2 | DIASTOLIC BLOOD PRESSURE: 78 MMHG | SYSTOLIC BLOOD PRESSURE: 160 MMHG | OXYGEN SATURATION: 96 % | HEIGHT: 61 IN | RESPIRATION RATE: 18 BRPM

## 2025-03-10 DIAGNOSIS — R51.9 ACUTE NONINTRACTABLE HEADACHE, UNSPECIFIED HEADACHE TYPE: ICD-10-CM

## 2025-03-10 DIAGNOSIS — R51.9 LEFT FACIAL PAIN: ICD-10-CM

## 2025-03-10 DIAGNOSIS — R68.84 JAW PAIN: ICD-10-CM

## 2025-03-10 LAB
ANION GAP SERPL CALCULATED.3IONS-SCNC: 11 MMOL/L (ref 7–15)
ATRIAL RATE - MUSE: 115 BPM
ATRIAL RATE - MUSE: 163 BPM
BASOPHILS # BLD AUTO: 0 10E3/UL (ref 0–0.2)
BASOPHILS NFR BLD AUTO: 0 %
BUN SERPL-MCNC: 16.3 MG/DL (ref 8–23)
CALCIUM SERPL-MCNC: 8.3 MG/DL (ref 8.8–10.4)
CHLORIDE SERPL-SCNC: 102 MMOL/L (ref 98–107)
CREAT SERPL-MCNC: 0.81 MG/DL (ref 0.51–0.95)
DIASTOLIC BLOOD PRESSURE - MUSE: NORMAL MMHG
DIASTOLIC BLOOD PRESSURE - MUSE: NORMAL MMHG
EGFRCR SERPLBLD CKD-EPI 2021: 70 ML/MIN/1.73M2
EOSINOPHIL # BLD AUTO: 0.2 10E3/UL (ref 0–0.7)
EOSINOPHIL NFR BLD AUTO: 2 %
ERYTHROCYTE [DISTWIDTH] IN BLOOD BY AUTOMATED COUNT: 13.2 % (ref 10–15)
FLUAV RNA SPEC QL NAA+PROBE: NEGATIVE
FLUBV RNA RESP QL NAA+PROBE: NEGATIVE
GLUCOSE SERPL-MCNC: 118 MG/DL (ref 70–99)
HCO3 SERPL-SCNC: 26 MMOL/L (ref 22–29)
HCT VFR BLD AUTO: 41.3 % (ref 35–47)
HGB BLD-MCNC: 14.3 G/DL (ref 11.7–15.7)
HOLD SPECIMEN: NORMAL
HOLD SPECIMEN: NORMAL
IMM GRANULOCYTES # BLD: 0 10E3/UL
IMM GRANULOCYTES NFR BLD: 1 %
INTERPRETATION ECG - MUSE: NORMAL
INTERPRETATION ECG - MUSE: NORMAL
LYMPHOCYTES # BLD AUTO: 2 10E3/UL (ref 0.8–5.3)
LYMPHOCYTES NFR BLD AUTO: 25 %
MCH RBC QN AUTO: 32.7 PG (ref 26.5–33)
MCHC RBC AUTO-ENTMCNC: 34.6 G/DL (ref 31.5–36.5)
MCV RBC AUTO: 95 FL (ref 78–100)
MONOCYTES # BLD AUTO: 0.6 10E3/UL (ref 0–1.3)
MONOCYTES NFR BLD AUTO: 8 %
NEUTROPHILS # BLD AUTO: 5.2 10E3/UL (ref 1.6–8.3)
NEUTROPHILS NFR BLD AUTO: 64 %
NRBC # BLD AUTO: 0 10E3/UL
NRBC BLD AUTO-RTO: 0 /100
P AXIS - MUSE: NORMAL DEGREES
P AXIS - MUSE: NORMAL DEGREES
PLATELET # BLD AUTO: 251 10E3/UL (ref 150–450)
POTASSIUM SERPL-SCNC: 3.4 MMOL/L (ref 3.4–5.3)
PR INTERVAL - MUSE: NORMAL MS
PR INTERVAL - MUSE: NORMAL MS
QRS DURATION - MUSE: 100 MS
QRS DURATION - MUSE: 98 MS
QT - MUSE: 400 MS
QT - MUSE: 414 MS
QTC - MUSE: 500 MS
QTC - MUSE: 502 MS
R AXIS - MUSE: 18 DEGREES
R AXIS - MUSE: 34 DEGREES
RBC # BLD AUTO: 4.37 10E6/UL (ref 3.8–5.2)
RSV RNA SPEC NAA+PROBE: NEGATIVE
SARS-COV-2 RNA RESP QL NAA+PROBE: NEGATIVE
SODIUM SERPL-SCNC: 139 MMOL/L (ref 135–145)
SYSTOLIC BLOOD PRESSURE - MUSE: NORMAL MMHG
SYSTOLIC BLOOD PRESSURE - MUSE: NORMAL MMHG
T AXIS - MUSE: -72 DEGREES
T AXIS - MUSE: -78 DEGREES
TROPONIN T SERPL HS-MCNC: 15 NG/L
TROPONIN T SERPL HS-MCNC: 16 NG/L
VENTRICULAR RATE- MUSE: 88 BPM
VENTRICULAR RATE- MUSE: 95 BPM
WBC # BLD AUTO: 8.1 10E3/UL (ref 4–11)

## 2025-03-10 PROCEDURE — 82435 ASSAY OF BLOOD CHLORIDE: CPT | Performed by: SOCIAL WORKER

## 2025-03-10 PROCEDURE — 84484 ASSAY OF TROPONIN QUANT: CPT | Performed by: SOCIAL WORKER

## 2025-03-10 PROCEDURE — 85025 COMPLETE CBC W/AUTO DIFF WBC: CPT | Performed by: SOCIAL WORKER

## 2025-03-10 PROCEDURE — 93005 ELECTROCARDIOGRAM TRACING: CPT | Mod: 76 | Performed by: SOCIAL WORKER

## 2025-03-10 PROCEDURE — 99285 EMERGENCY DEPT VISIT HI MDM: CPT | Mod: 25 | Performed by: SOCIAL WORKER

## 2025-03-10 PROCEDURE — 36415 COLL VENOUS BLD VENIPUNCTURE: CPT | Performed by: SOCIAL WORKER

## 2025-03-10 PROCEDURE — 93005 ELECTROCARDIOGRAM TRACING: CPT | Performed by: SOCIAL WORKER

## 2025-03-10 PROCEDURE — 87637 SARSCOV2&INF A&B&RSV AMP PRB: CPT | Performed by: SOCIAL WORKER

## 2025-03-10 PROCEDURE — 80048 BASIC METABOLIC PNL TOTAL CA: CPT | Performed by: SOCIAL WORKER

## 2025-03-10 PROCEDURE — 70450 CT HEAD/BRAIN W/O DYE: CPT

## 2025-03-10 ASSESSMENT — ACTIVITIES OF DAILY LIVING (ADL)
ADLS_ACUITY_SCORE: 51

## 2025-03-10 ASSESSMENT — COLUMBIA-SUICIDE SEVERITY RATING SCALE - C-SSRS
1. IN THE PAST MONTH, HAVE YOU WISHED YOU WERE DEAD OR WISHED YOU COULD GO TO SLEEP AND NOT WAKE UP?: NO
6. HAVE YOU EVER DONE ANYTHING, STARTED TO DO ANYTHING, OR PREPARED TO DO ANYTHING TO END YOUR LIFE?: NO
2. HAVE YOU ACTUALLY HAD ANY THOUGHTS OF KILLING YOURSELF IN THE PAST MONTH?: NO

## 2025-03-10 NOTE — DISCHARGE INSTRUCTIONS
You were seen in the emergency department for headache and pain over the left side of your face and jaw area.  Your exam workup here was overall reassuring, we do not see signs of acute emergency at this time.    Please schedule a follow-up appointment with your primary care provider within the week, before the weekend. Please also have your blood pressure rechecked as it was elevated here.     Please back to the emergency department if you start to have any chest pain, rash that develops over left side of your face, you have sudden severe headache again that does not go away, you have numbness or weakness anywhere that is new, difficulty breathing, or any other concerning symptoms.

## 2025-03-10 NOTE — ED PROVIDER NOTES
"  Emergency Department Note      History of Present Illness     Chief Complaint   Facial Pain      HPI   Brittani Ty is a 84 year old female with a history of persistent afib, mitral valve stenosis and regurg, non-obstructive CAD, CKD, HTN, HLD, who presents to the ED with chief complaint of facial pain. Patient reports that she has had a stuffy nose for the past 8-10 days without any fever or purulent drainage. At breakfast today, she developed a sudden headache, not the most severe in her life, followed by severe facial/jaw pain and felt nauseated. Her symptoms have now resolved. She thinks they lasted about 20 minutes. No vision changes. Pain was not worsened by chewing. Baseline has pain with turning her head and this has not changed.  She has not had any chest pain or shortness of breath.  No new numbness weakness anywhere.  She has not had any fevers.  Clines analgesia at this time.    Independent Historian   None    Review of External Notes   I reviewed the office visit from 1/29/25: Seen for follow up, noted to have mild LE edema. Lives in independent living. On Eliquis.     Past Medical History     Medical History and Problem List   Afib   Arthritis  CKD  CAD, non-obstructive   Dyspepsia  Hyperlipidemia  Hypertension  Hearing loss   Hypothyroidism   Hypoparathyroidism  Mitral valve disorder  PONV   Rheumatic heart disease  Thyroid disease  Tremor   Vitamin D deficiency  Mitral valve stenosis and regurgitation     Medications   Amiodarone   Eliquis 5 mg   Amlodipine   Levothyroxine   Metoprolol succinate ER     Surgical History   Cholecystectomy   Colonoscopy   Hysterectomy   Capsulotomy   Phacoemulsification   Sphincterotomy     Physical Exam     Patient Vitals for the past 24 hrs:   BP Temp Temp src Pulse Resp SpO2 Height Weight   03/10/25 1350 (!) 160/78 -- -- 76 -- 96 % -- --   03/10/25 0951 (!) 179/82 97.6  F (36.4  C) Temporal 91 18 98 % 1.549 m (5' 1\") 76.9 kg (169 lb 8.5 oz)     Physical " Exam  General: Overall stable and nontoxic appearing  Cerumen   Neuro: Alert, conversant; no facial droop, no slurred speech; strength and sensation grossly intact and equal bilaterally in upper and lower extremities; gait is intact without ataxia   CV: Regular rate, regular rhythm, radial and DP pulses equal  Respiratory: No signs of respiratory distress, lungs clear to auscultation bilaterally   Abdomen: Soft, without rigidity or rebound throughout  MSK: No lower extremity pitting edema or unilateral swelling.    Diagnostics     Lab Results   Labs Ordered and Resulted from Time of ED Arrival to Time of ED Departure   BASIC METABOLIC PANEL - Abnormal       Result Value    Sodium 139      Potassium 3.4      Chloride 102      Carbon Dioxide (CO2) 26      Anion Gap 11      Urea Nitrogen 16.3      Creatinine 0.81      GFR Estimate 70      Calcium 8.3 (*)     Glucose 118 (*)    TROPONIN T, HIGH SENSITIVITY - Abnormal    Troponin T, High Sensitivity 16 (*)    TROPONIN T, HIGH SENSITIVITY - Abnormal    Troponin T, High Sensitivity 15 (*)    INFLUENZA A/B, RSV AND SARS-COV2 PCR - Normal    Influenza A PCR Negative      Influenza B PCR Negative      RSV PCR Negative      SARS CoV2 PCR Negative     CBC WITH PLATELETS AND DIFFERENTIAL    WBC Count 8.1      RBC Count 4.37      Hemoglobin 14.3      Hematocrit 41.3      MCV 95      MCH 32.7      MCHC 34.6      RDW 13.2      Platelet Count 251      % Neutrophils 64      % Lymphocytes 25      % Monocytes 8      % Eosinophils 2      % Basophils 0      % Immature Granulocytes 1      NRBCs per 100 WBC 0      Absolute Neutrophils 5.2      Absolute Lymphocytes 2.0      Absolute Monocytes 0.6      Absolute Eosinophils 0.2      Absolute Basophils 0.0      Absolute Immature Granulocytes 0.0      Absolute NRBCs 0.0         Imaging   Head CT w/o contrast   Final Result   IMPRESSION:   1.  No acute intracranial process.    2.  Mild nonspecific white matter changes likely due to chronic  microvascular ischemic disease.                            EKG   Please see ED course below     Independent Interpretation   CT Head: No intracranial hemorrhage or midline shift.    ED Course      Medications Administered   Medications - No data to display    Procedures   Procedures     Discussion of Management   None    ED Course   ED Course as of 03/10/25 1555   Mon Mar 10, 2025   1005 I obtained history and examined the patient as noted above.    1123 EKG 1057  Ventricular paced rhythm without any Sgarbossa criteria, prolonged QTc  ST depression in V5 V6 appear to be new compared to EKG from March 5, 2024, though at that time patient is not ventricularly paced  Rate 95  QTc 502   1125 Interpretation, CT head without any evidence of bleed   1238 EKG #2   No changes from prior  Rate 88 QRS 98 Qtc 500    1355 Reassessed, patient feels entirely at her baseline       Additional Documentation  None    Medical Decision Making / Diagnosis     CMS Diagnoses: None    MIPS       None    [facility]   Brittani Ty is a 84 year old female with a history of A-fib status post pacemaker, mitral valve stenosis, nonobstructive CAD, CKD, hypertension and hyperlipidemia who presents to the emergency department with chief complaint of an episode of headache and left-sided facial/jaw pain not resolved.  On examination she is stable and nontoxic-appearing.    Considered CVA, reassuringly she does not have any focal neurologic deficit, sensations intact no facial droop.  This does not sound convincingly like a TIA either.  Considered atypical chest pain with referral to the jaw, her EKG was repeated x 2 and shows no dynamic changes, troponin x 2 was without any delta and she actually does not have any chest pain, I feel that ACS is less likely as well.  I considered shingles, there is no rash seen, although did discuss that this could still develop as pain could be the first symptom.  Considered GCA, she has no vision changes, she has  no tenderness to palpation over the temporal artery, pain was not at all worsened by chewing.  Considered intracranial hemorrhage, did obtain CT scan within 6 hours of symptom onset no signs of bleed I think this is less likely especially as her headache has resolved.  No evidence of intraoral infection or deep space infection of the oropharynx.  Considered aortic dissection, however I think isolated presentation of head symptoms without any chest pain or back pain, presence of equal pulses, makes this less likely as well. this may have been something such as trigeminal neuralgia, however patient at this time is entirely and symptomatic and has been stable in the ED throughout her stay.    Discussed close follow-up with her primary care doctor within the week.  She has had a recent pacemaker checkup in the last month.  We also discussed tricked return precautions to the emergency department, patient and her son verbalized understanding.  They are comfortable with the plan for discharge at this time.    Disposition   The patient was discharged.     Diagnosis     ICD-10-CM    1. Left facial pain  R51.9       2. Jaw pain  R68.84       3. Acute nonintractable headache, unspecified headache type  R51.9            Discharge Medications   New Prescriptions    No medications on file         Scribe Disclosure:  I, Sharron Lara, am serving as a scribe at 10:12 AM on 3/10/2025 to document services personally performed by Lucie Segovia MD based on my observations and the provider's statements to me.        Lucie Segovia MD  03/10/25 [id number]

## 2025-03-10 NOTE — ED TRIAGE NOTES
Patient reports left sided facial pain after breakfast today.  Denies dental pain.  Reports pain radiates up into left ear.  ABCs intact,A&Ox4.     Triage Assessment (Adult)       Row Name 03/10/25 0950          Triage Assessment    Airway WDL WDL        Respiratory WDL    Respiratory WDL WDL        Skin Circulation/Temperature WDL    Skin Circulation/Temperature WDL WDL        Cardiac WDL    Cardiac WDL WDL        Peripheral/Neurovascular WDL    Peripheral Neurovascular WDL WDL        Cognitive/Neuro/Behavioral WDL    Cognitive/Neuro/Behavioral WDL WDL

## 2025-04-25 ENCOUNTER — HOSPITAL ENCOUNTER (EMERGENCY)
Facility: CLINIC | Age: 85
Discharge: HOME | End: 2025-04-25
Attending: EMERGENCY MEDICINE | Admitting: EMERGENCY MEDICINE
Payer: COMMERCIAL

## 2025-04-25 VITALS
HEART RATE: 90 BPM | SYSTOLIC BLOOD PRESSURE: 152 MMHG | TEMPERATURE: 98 F | RESPIRATION RATE: 18 BRPM | DIASTOLIC BLOOD PRESSURE: 75 MMHG | BODY MASS INDEX: 31.91 KG/M2 | WEIGHT: 168.87 LBS | OXYGEN SATURATION: 100 %

## 2025-04-25 DIAGNOSIS — Z04.9 OBSERVATION AND EVALUATION FOR SUSPECTED CONDITIONS NOT FOUND: ICD-10-CM

## 2025-04-25 DIAGNOSIS — Z97.4 WEARS HEARING AID: ICD-10-CM

## 2025-04-25 PROCEDURE — 99282 EMERGENCY DEPT VISIT SF MDM: CPT

## 2025-04-26 NOTE — ED PROVIDER NOTES
Emergency Department Note      History of Present Illness     Chief Complaint   object in ear      HPI   Brittani Ty is a 84 year old female with a history of atrial fibrillation anticoagulated with Eliquis, hypertension, and stage 3b CKD who presents to the ED for evaluation of a foreign body in the ear. The patient states she had left ear pain about 11 days ago and found the end of her hearing aid was missing. She was seen at her PCP who was able to remove the piece of the hearing aid. They noted otitis media at the time and was started on a course of antibiotics which she has finished with improvement. About 9 days, this same thing occurred with her right hearing aid. They were again able to remove this at her PCP office. She had her ears well cleaned at the ENT about 3 days ago. Today, she noted her left hearing aid hanging out of her ear and found the black end was again missing. Expresses concern it is in her ear canal.  Denies pain.    Independent Historian   None    Review of External Notes   I reviewed the office visit note from 4/22/25.    Past Medical History     Medical History and Problem List   ASHD  Lumbar disc displacement  Esophageal stricture  HTN  Essential tremor  Hyperparathyroidism  Hypothyroidism  Aortic valve disorder  Mitral valve disorder  Rheumatic heart disease  Hearing loss  CKD, stage 3b  Atrial fibrillation     Medications   Eliquis  Fluticasone  Pantoprazole  Pacerone  Levothyroxine  Elavil  Amlodipine  Lasix  Toprol  Pravastatin    Surgical History   Cholecystectomy   Parathyroid tumor removal  Hysterectomy   Laser YAG capsulotomy (B)  Phacoemulsification   Rectum sphincterotomy    Physical Exam     Patient Vitals for the past 24 hrs:   BP Temp Temp src Pulse Resp SpO2 Weight   04/25/25 2102 (!) 152/75 98  F (36.7  C) Temporal 90 18 100 % 76.6 kg (168 lb 14 oz)     Physical Exam  Resp:  Non-labored  Neuro:  Alert and cooperative  MSkel:  Moving all extremities  Skin:  No  rash  ENT:  L ear canal completely normal, ear drum fully visualized, no foreign body found        Diagnostics     Lab Results   Labs Ordered and Resulted from Time of ED Arrival to Time of ED Departure - No data to display    Imaging   No orders to display     Independent Interpretation   None    ED Course      Medications Administered   Medications - No data to display    Procedures   Procedures     Discussion of Management   None    ED Course   ED Course as of 04/25/25 2116 Fri Apr 25, 2025 2109 I obtained history and performed a physical exam as noted above.        Additional Documentation  None    Medical Decision Making / Diagnosis     CMS Diagnoses: None    MIPS       None    MDM   No foreign body found in the ear canal which is fully open and not obscured by wax that could limit visualization.  No further management needed    Disposition   The patient was discharged.     Diagnosis     ICD-10-CM    1. Wears hearing aid  Z97.4       2. Observation and evaluation for suspected conditions not found  Z04.9            Discharge Medications   New Prescriptions    No medications on file         Scribe Disclosure:  Luna LOMBARDI, am serving as a scribe at 9:10 PM on 4/25/2025 to document services personally performed by Sydnie Mas MD based on my observations and the provider's statements to me.        Sydnie Mas MD  04/25/25 7226

## 2025-04-26 NOTE — ED TRIAGE NOTES
Hearing aid tip stuck several times left side got infected now left side stuck again. AO VSS. Not able to visualize the object in triage.

## 2025-06-12 RX ORDER — PANTOPRAZOLE SODIUM 40 MG/1
1 TABLET, DELAYED RELEASE ORAL DAILY
COMMUNITY
Start: 2025-04-07

## 2025-06-12 RX ORDER — SENNOSIDES 8.6 MG
1300 CAPSULE ORAL EVERY 8 HOURS
COMMUNITY
Start: 2025-05-09

## 2025-06-12 RX ORDER — MECLIZINE HYDROCHLORIDE 25 MG/1
25 TABLET ORAL 3 TIMES DAILY PRN
COMMUNITY
Start: 2025-05-09

## 2025-06-19 ENCOUNTER — ANESTHESIA EVENT (OUTPATIENT)
Dept: SURGERY | Facility: CLINIC | Age: 85
End: 2025-06-19
Payer: COMMERCIAL

## 2025-06-19 ENCOUNTER — ANESTHESIA (OUTPATIENT)
Dept: SURGERY | Facility: CLINIC | Age: 85
End: 2025-06-19
Payer: COMMERCIAL

## 2025-06-19 ENCOUNTER — HOSPITAL ENCOUNTER (OUTPATIENT)
Facility: CLINIC | Age: 85
Discharge: HOME | End: 2025-06-19
Attending: INTERNAL MEDICINE | Admitting: INTERNAL MEDICINE
Payer: COMMERCIAL

## 2025-06-19 VITALS
BODY MASS INDEX: 31.21 KG/M2 | RESPIRATION RATE: 15 BRPM | SYSTOLIC BLOOD PRESSURE: 152 MMHG | OXYGEN SATURATION: 100 % | WEIGHT: 165.2 LBS | HEART RATE: 76 BPM | DIASTOLIC BLOOD PRESSURE: 77 MMHG | TEMPERATURE: 97.1 F

## 2025-06-19 LAB — UPPER GI ENDOSCOPY: NORMAL

## 2025-06-19 PROCEDURE — 272N000001 HC OR GENERAL SUPPLY STERILE: Performed by: INTERNAL MEDICINE

## 2025-06-19 PROCEDURE — 360N000075 HC SURGERY LEVEL 2, PER MIN: Performed by: INTERNAL MEDICINE

## 2025-06-19 PROCEDURE — 370N000017 HC ANESTHESIA TECHNICAL FEE, PER MIN: Performed by: INTERNAL MEDICINE

## 2025-06-19 PROCEDURE — 999N000141 HC STATISTIC PRE-PROCEDURE NURSING ASSESSMENT: Performed by: INTERNAL MEDICINE

## 2025-06-19 PROCEDURE — 250N000011 HC RX IP 250 OP 636: Performed by: NURSE ANESTHETIST, CERTIFIED REGISTERED

## 2025-06-19 PROCEDURE — 710N000012 HC RECOVERY PHASE 2, PER MINUTE: Performed by: INTERNAL MEDICINE

## 2025-06-19 PROCEDURE — 250N000009 HC RX 250: Performed by: NURSE ANESTHETIST, CERTIFIED REGISTERED

## 2025-06-19 PROCEDURE — C1726 CATH, BAL DIL, NON-VASCULAR: HCPCS | Performed by: INTERNAL MEDICINE

## 2025-06-19 PROCEDURE — 258N000003 HC RX IP 258 OP 636: Performed by: ANESTHESIOLOGY

## 2025-06-19 PROCEDURE — 258N000003 HC RX IP 258 OP 636: Performed by: NURSE ANESTHETIST, CERTIFIED REGISTERED

## 2025-06-19 RX ORDER — PROPOFOL 10 MG/ML
INJECTION, EMULSION INTRAVENOUS PRN
Status: DISCONTINUED | OUTPATIENT
Start: 2025-06-19 | End: 2025-06-19

## 2025-06-19 RX ORDER — NALOXONE HYDROCHLORIDE 0.4 MG/ML
0.4 INJECTION, SOLUTION INTRAMUSCULAR; INTRAVENOUS; SUBCUTANEOUS
Status: DISCONTINUED | OUTPATIENT
Start: 2025-06-19 | End: 2025-06-19 | Stop reason: HOSPADM

## 2025-06-19 RX ORDER — KETAMINE HYDROCHLORIDE 10 MG/ML
INJECTION INTRAMUSCULAR; INTRAVENOUS PRN
Status: DISCONTINUED | OUTPATIENT
Start: 2025-06-19 | End: 2025-06-19

## 2025-06-19 RX ORDER — PROPOFOL 10 MG/ML
INJECTION, EMULSION INTRAVENOUS CONTINUOUS PRN
Status: DISCONTINUED | OUTPATIENT
Start: 2025-06-19 | End: 2025-06-19

## 2025-06-19 RX ORDER — ONDANSETRON 2 MG/ML
4 INJECTION INTRAMUSCULAR; INTRAVENOUS EVERY 6 HOURS PRN
Status: DISCONTINUED | OUTPATIENT
Start: 2025-06-19 | End: 2025-06-19 | Stop reason: HOSPADM

## 2025-06-19 RX ORDER — SODIUM CHLORIDE, SODIUM LACTATE, POTASSIUM CHLORIDE, CALCIUM CHLORIDE 600; 310; 30; 20 MG/100ML; MG/100ML; MG/100ML; MG/100ML
INJECTION, SOLUTION INTRAVENOUS CONTINUOUS
Status: DISCONTINUED | OUTPATIENT
Start: 2025-06-19 | End: 2025-06-19 | Stop reason: HOSPADM

## 2025-06-19 RX ORDER — DEXAMETHASONE SODIUM PHOSPHATE 4 MG/ML
4 INJECTION, SOLUTION INTRA-ARTICULAR; INTRALESIONAL; INTRAMUSCULAR; INTRAVENOUS; SOFT TISSUE
Status: DISCONTINUED | OUTPATIENT
Start: 2025-06-19 | End: 2025-06-19 | Stop reason: HOSPADM

## 2025-06-19 RX ORDER — NALOXONE HYDROCHLORIDE 0.4 MG/ML
0.2 INJECTION, SOLUTION INTRAMUSCULAR; INTRAVENOUS; SUBCUTANEOUS
Status: DISCONTINUED | OUTPATIENT
Start: 2025-06-19 | End: 2025-06-19 | Stop reason: HOSPADM

## 2025-06-19 RX ORDER — ONDANSETRON 4 MG/1
4 TABLET, ORALLY DISINTEGRATING ORAL EVERY 6 HOURS PRN
Status: DISCONTINUED | OUTPATIENT
Start: 2025-06-19 | End: 2025-06-19 | Stop reason: HOSPADM

## 2025-06-19 RX ORDER — ONDANSETRON 2 MG/ML
4 INJECTION INTRAMUSCULAR; INTRAVENOUS EVERY 30 MIN PRN
Status: DISCONTINUED | OUTPATIENT
Start: 2025-06-19 | End: 2025-06-19 | Stop reason: HOSPADM

## 2025-06-19 RX ORDER — ONDANSETRON 2 MG/ML
4 INJECTION INTRAMUSCULAR; INTRAVENOUS
Status: DISCONTINUED | OUTPATIENT
Start: 2025-06-19 | End: 2025-06-19 | Stop reason: HOSPADM

## 2025-06-19 RX ORDER — PROCHLORPERAZINE MALEATE 5 MG/1
5 TABLET ORAL EVERY 6 HOURS PRN
Status: DISCONTINUED | OUTPATIENT
Start: 2025-06-19 | End: 2025-06-19 | Stop reason: HOSPADM

## 2025-06-19 RX ORDER — ONDANSETRON 4 MG/1
4 TABLET, ORALLY DISINTEGRATING ORAL EVERY 30 MIN PRN
Status: DISCONTINUED | OUTPATIENT
Start: 2025-06-19 | End: 2025-06-19 | Stop reason: HOSPADM

## 2025-06-19 RX ORDER — ACETAMINOPHEN 325 MG/1
975 TABLET ORAL
Status: DISCONTINUED | OUTPATIENT
Start: 2025-06-19 | End: 2025-06-19 | Stop reason: HOSPADM

## 2025-06-19 RX ORDER — LIDOCAINE 40 MG/G
CREAM TOPICAL
Status: DISCONTINUED | OUTPATIENT
Start: 2025-06-19 | End: 2025-06-19 | Stop reason: HOSPADM

## 2025-06-19 RX ORDER — NALOXONE HYDROCHLORIDE 0.4 MG/ML
0.1 INJECTION, SOLUTION INTRAMUSCULAR; INTRAVENOUS; SUBCUTANEOUS
Status: DISCONTINUED | OUTPATIENT
Start: 2025-06-19 | End: 2025-06-19 | Stop reason: HOSPADM

## 2025-06-19 RX ORDER — FLUMAZENIL 0.1 MG/ML
0.2 INJECTION, SOLUTION INTRAVENOUS
Status: DISCONTINUED | OUTPATIENT
Start: 2025-06-19 | End: 2025-06-19 | Stop reason: HOSPADM

## 2025-06-19 RX ORDER — SODIUM CHLORIDE, SODIUM LACTATE, POTASSIUM CHLORIDE, CALCIUM CHLORIDE 600; 310; 30; 20 MG/100ML; MG/100ML; MG/100ML; MG/100ML
INJECTION, SOLUTION INTRAVENOUS CONTINUOUS PRN
Status: DISCONTINUED | OUTPATIENT
Start: 2025-06-19 | End: 2025-06-19

## 2025-06-19 RX ADMIN — PROPOFOL 100 MCG/KG/MIN: 10 INJECTION, EMULSION INTRAVENOUS at 10:05

## 2025-06-19 RX ADMIN — SODIUM CHLORIDE, SODIUM LACTATE, POTASSIUM CHLORIDE, AND CALCIUM CHLORIDE: .6; .31; .03; .02 INJECTION, SOLUTION INTRAVENOUS at 09:57

## 2025-06-19 RX ADMIN — SODIUM CHLORIDE, SODIUM LACTATE, POTASSIUM CHLORIDE, AND CALCIUM CHLORIDE: .6; .31; .03; .02 INJECTION, SOLUTION INTRAVENOUS at 08:34

## 2025-06-19 RX ADMIN — Medication 20 MG: at 10:09

## 2025-06-19 RX ADMIN — PROPOFOL 20 MG: 10 INJECTION, EMULSION INTRAVENOUS at 10:06

## 2025-06-19 RX ADMIN — PROPOFOL 20 MG: 10 INJECTION, EMULSION INTRAVENOUS at 10:08

## 2025-06-19 ASSESSMENT — ENCOUNTER SYMPTOMS: DYSRHYTHMIAS: 1

## 2025-06-19 ASSESSMENT — ACTIVITIES OF DAILY LIVING (ADL)
ADLS_ACUITY_SCORE: 45

## 2025-06-19 NOTE — ANESTHESIA POSTPROCEDURE EVALUATION
Patient: Brittani Ty    Procedure: Procedure(s):  ESOPHAGOGASTRODUODENOSCOPY WITH DILATION       Anesthesia Type:  MAC    Note:  Disposition: Outpatient   Postop Pain Control: Uneventful            Sign Out: Well controlled pain   PONV: No   Neuro/Psych: Uneventful            Sign Out: Acceptable/Baseline neuro status   Airway/Respiratory: Uneventful            Sign Out: Acceptable/Baseline resp. status   CV/Hemodynamics: Uneventful            Sign Out: Acceptable CV status; No obvious hypovolemia; No obvious fluid overload   Other NRE:    DID A NON-ROUTINE EVENT OCCUR? No           Last vitals:  Vitals Value Taken Time   /77 06/19/25 11:00   Temp 97.1  F (36.2  C) 06/19/25 10:28   Pulse 76 06/19/25 11:00   Resp 15 06/19/25 10:30   SpO2 99 % 06/19/25 11:03   Vitals shown include unfiled device data.    Electronically Signed By: Sabina Petersen MD  June 19, 2025  11:34 AM

## 2025-06-19 NOTE — ANESTHESIA PREPROCEDURE EVALUATION
Anesthesia Pre-Procedure Evaluation    Patient: Brittani Ty   MRN: [id number] : 1940          Procedure : Procedure(s):  ESOPHAGOGASTRODUODENOSCOPY         Past Medical History:   Diagnosis Date     Aortic valve disorder      Arthritis      Chest pain, unspecified     ECHO 2011-NO ISCHEMIA     Dyspepsia      Hyperlipidemia      Hypertension      Hypoparathyroidism      Mitral valve disorder      Overweight(278.02)      Pacemaker      PONV (postoperative nausea and vomiting)      Rheumatic heart disease      Thyroid disease     HYPOTHYROID     Vitamin D deficiency       Past Surgical History:   Procedure Laterality Date     CHOLECYSTECTOMY       COLONOSCOPY       COLONOSCOPY N/A 3/10/2021    Procedure: COLONOSCOPY;  Surgeon: Reginaldo Bear MD;  Location:  GI     ESOPHAGOSCOPY, GASTROSCOPY, DUODENOSCOPY (EGD), COMBINED N/A 3/11/2024    Procedure: Esophagogastroduodenoscopy with biopsies;  Surgeon: Kateryna Doss MD;  Location:  OR     EXAM UNDER ANESTHESIA RECTUM  2012    Procedure:EXAM UNDER ANESTHESIA RECTUM; exam under anesthesia, partial sphincterotomy; Surgeon:AMBROCIO ZUNIGA; Location:[facility]     HEAD & NECK SURGERY      Parathyroid tumor     HYSTERECTOMY       LASER YAG CAPSULOTOMY Left 4/15/2015    Procedure: LASER YAG CAPSULOTOMY;  Surgeon: Gary Blackburn MD;  Location:  EC     LASER YAG CAPSULOTOMY Right 2017    Procedure: LASER YAG CAPSULOTOMY;  Surgeon: Gary Blackburn MD;  Location:  [facility]     PHACOEMULSIFICATION CLEAR CORNEA WITH STANDARD INTRAOCULAR LENS IMPLANT Right 9/3/2014    Procedure: PHACOEMULSIFICATION CLEAR CORNEA WITH STANDARD INTRAOCULAR LENS IMPLANT;  Surgeon: Gary Blackburn MD;  Location:  [facility]     PHACOEMULSIFICATION CLEAR CORNEA WITH STANDARD INTRAOCULAR LENS IMPLANT Left 2014    Procedure: PHACOEMULSIFICATION CLEAR CORNEA WITH STANDARD INTRAOCULAR LENS IMPLANT;  Surgeon: Gary Blackburn MD;  Location: [facility]      "SPHINCTEROTOMY RECTUM  4/26/2012    Procedure:SPHINCTEROTOMY RECTUM; Surgeon:AMBROCIO ZUNIGA; Location:[facility]      Allergies   Allergen Reactions     Amoxicillin Nausea and Vomiting     Augmentin [Amoxicillin-Pot Clavulanate] Nausea and Vomiting     Lisinopril Cough     Oxycodone Other (See Comments)     Percocet [Oxycodone-Acetaminophen]      Primidone      Other reaction(s): Sedation     Propranolol      Bradycardia into 40's      Sulindac Nausea and Vomiting     Other reaction(s): Vomiting     Vioxx      Celecoxib Palpitations     Erythromycin Rash     Onion Nausea and Vomiting     \"Makes me sick\"- vomiting- \"the whole bit\"      Social History     Tobacco Use     Smoking status: Never     Smokeless tobacco: Never   Substance Use Topics     Alcohol use: Yes     Comment: rare      Wt Readings from Last 1 Encounters:   06/19/25 74.9 kg (165 lb 3.2 oz)        Anesthesia Evaluation            ROS/MED HX  ENT/Pulmonary:  - neg pulmonary ROS     Neurologic:       Cardiovascular: Comment: 2024- cardioversion, ablation, pacemaker placement  No recent echo available        (+)  hypertension- -   -  - -   Taking blood thinners           pacemaker,          dysrhythmias, a-fib,             METS/Exercise Tolerance:     Hematologic:       Musculoskeletal:       GI/Hepatic: Comment: Esophageal stricture        Renal/Genitourinary:     (+) renal disease, type: CRI,            Endo:     (+)          thyroid problem,            Psychiatric/Substance Use:       Infectious Disease:       Malignancy:       Other:      (+)  , H/O Chronic Pain,           Physical Exam  Airway  Mallampati: II  TM distance: >3 FB  Neck ROM: full  Mouth opening: >= 4 cm    Cardiovascular - normal exam Comments: 2024- cardioversion, ablation, pacemaker placement  No recent echo available      Dental     Pulmonary - normal exam      Neurological   Other Findings       OUTSIDE LABS:  CBC:   Lab Results   Component Value Date    WBC 8.1 03/10/2025    " "WBC 7.7 03/05/2024    HGB 14.3 03/10/2025    HGB 13.7 03/05/2024    HCT 41.3 03/10/2025    HCT 39.8 03/05/2024     03/10/2025     03/05/2024     BMP:   Lab Results   Component Value Date     03/10/2025     03/05/2024    POTASSIUM 3.4 03/10/2025    POTASSIUM 4.4 03/05/2024    CHLORIDE 102 03/10/2025    CHLORIDE 104 03/05/2024    CO2 26 03/10/2025    CO2 26 03/05/2024    BUN 16.3 03/10/2025    BUN 16.5 03/05/2024    CR 0.81 03/10/2025    CR 0.94 03/05/2024     (H) 03/10/2025     (H) 03/05/2024     COAGS:   Lab Results   Component Value Date    PTT 40 (H) 05/19/2023    INR 1.35 (H) 05/19/2023     POC: No results found for: \"BGM\", \"HCG\", \"HCGS\"  HEPATIC:   Lab Results   Component Value Date    ALBUMIN 4.3 05/19/2023    PROTTOTAL 7.0 05/19/2023    ALT 21 05/19/2023    AST 24 05/19/2023    ALKPHOS 61 05/19/2023    BILITOTAL 0.4 05/19/2023     OTHER:   Lab Results   Component Value Date    JAIME 8.3 (L) 03/10/2025    MAG 2.2 03/05/2024    TSH 8.10 (H) 11/04/2022    T4 1.74 (H) 11/04/2022       Anesthesia Plan    ASA Status:  3      NPO Status: NPO Appropriate   Anesthesia Type: MAC.  Airway: natural airway.  Induction: intravenous.  Maintenance: TIVA.   Techniques and Equipment:       - Monitoring Plan: standard ASA monitoring     Consents    Anesthesia Plan(s) and associated risks, benefits, and realistic alternatives discussed. Questions answered and patient/representative(s) expressed understanding.     - Discussed: CRNA     - Discussed with:  Patient               Postoperative Care    Pain management: non-narcotic analgesics.     Comments:                 Sabina Petersen MD    I have reviewed the pertinent notes and labs in the chart from the past 30 days and (re)examined the patient.  Any updates or changes from those notes are reflected in this note.    Clinically Significant Risk Factors Present on Admission                # Drug Induced Coagulation Defect: home medication list " includes an anticoagulant medication

## 2025-06-19 NOTE — ANESTHESIA CARE TRANSFER NOTE
Patient: Brittani Ty    Procedure: Procedure(s):  ESOPHAGOGASTRODUODENOSCOPY WITH DILATION       Diagnosis: Abnormal finding on imaging [R93.89]  Dysphagia [R13.10]  Diagnosis Additional Information: No value filed.    Anesthesia Type:   MAC     Note:    Oropharynx: oropharynx clear of all foreign objects and spontaneously breathing  Level of Consciousness: awake  Oxygen Supplementation: room air    Independent Airway: airway patency satisfactory and stable  Dentition: dentition unchanged  Vital Signs Stable: post-procedure vital signs reviewed and stable  Report to RN Given: handoff report given  Patient transferred to: Phase II  Comments: Patients meets criteria for phase 2 recovery. VSS. Report to RN  Handoff Report: Identifed the Patient, Identified the Reponsible Provider, Reviewed the pertinent medical history, Discussed the surgical course, Reviewed Intra-OP anesthesia mangement and issues during anesthesia, Set expectations for post-procedure period and Allowed opportunity for questions and acknowledgement of understanding      Vitals:  Vitals Value Taken Time   BP     Temp     Pulse     Resp     SpO2 97 % 06/19/25 10:29   Vitals shown include unfiled device data.    Electronically Signed By: TAMAR Tan CRNA  June 19, 2025  10:30 AM

## 2025-06-19 NOTE — DISCHARGE INSTRUCTIONS
TERRI GOODRICH M.D.              CLINIC PHONE NUMBER:  [phone]  [facility]      Restart your Eliquis on 6/21/2025

## (undated) DEVICE — GOWN XLG DISP 9545

## (undated) DEVICE — LINEN HALF SHEET 5512

## (undated) DEVICE — INFLATION DEVICE BIG 60 ENDO-AN6012

## (undated) DEVICE — SOLUTION WATER 1000ML BOTTLE R5000-01

## (undated) DEVICE — UNDERPAD 36X30 PREMIERPRO MAX ABS NS LF 676111

## (undated) DEVICE — BALLOON ELATION 240CML 14-15-16.5-18-19MM 5.5CM EPCX15

## (undated) DEVICE — KIT ENDO TURNOVER/PROCEDURE W/CLEAN A SCOPE LINERS 103888

## (undated) DEVICE — BAG CLEAR TRASH 1.3M 39X33" P4040C

## (undated) DEVICE — KIT PROCEDURE W/CLEAN-A-SCOPE LINERS V2 200800

## (undated) DEVICE — TUBING SUCTION MEDI-VAC SOFT 3/16"X20' N520A

## (undated) DEVICE — CATH BALLOON MERIT ESOPH FIVE-STAGE 17X21MMX180CM EX18

## (undated) DEVICE — SUCTION MANIFOLD NEPTUNE 2 SYS 4 PORT 0702-020-000

## (undated) DEVICE — LINEN FULL SHEET 5511

## (undated) DEVICE — TUBING SUCTION 6"X3/16" N56A

## (undated) DEVICE — PAD CHUX UNDERPAD 30X36" P3036C

## (undated) DEVICE — ENDO FORCEP ENDOJAW BIOPSY 2.8MMX160CM FB-220K

## (undated) DEVICE — SOL WATER IRRIG 1000ML BOTTLE 2F7114

## (undated) RX ORDER — ONDANSETRON 2 MG/ML
INJECTION INTRAMUSCULAR; INTRAVENOUS
Status: DISPENSED
Start: 2024-03-11

## (undated) RX ORDER — PROPOFOL 10 MG/ML
INJECTION, EMULSION INTRAVENOUS
Status: DISPENSED
Start: 2024-03-11

## (undated) RX ORDER — DIPHENHYDRAMINE HYDROCHLORIDE 50 MG/ML
INJECTION INTRAMUSCULAR; INTRAVENOUS
Status: DISPENSED
Start: 2021-03-10

## (undated) RX ORDER — FENTANYL CITRATE 50 UG/ML
INJECTION, SOLUTION INTRAMUSCULAR; INTRAVENOUS
Status: DISPENSED
Start: 2021-03-10

## (undated) RX ORDER — SIMETHICONE 40MG/0.6ML
SUSPENSION, DROPS(FINAL DOSAGE FORM)(ML) ORAL
Status: DISPENSED
Start: 2021-03-10